# Patient Record
Sex: MALE | Race: BLACK OR AFRICAN AMERICAN | Employment: FULL TIME | URBAN - METROPOLITAN AREA
[De-identification: names, ages, dates, MRNs, and addresses within clinical notes are randomized per-mention and may not be internally consistent; named-entity substitution may affect disease eponyms.]

---

## 2021-06-30 PROCEDURE — 99283 EMERGENCY DEPT VISIT LOW MDM: CPT

## 2021-07-01 ENCOUNTER — HOSPITAL ENCOUNTER (EMERGENCY)
Age: 36
Discharge: HOME | End: 2021-07-01
Payer: COMMERCIAL

## 2021-07-01 VITALS
SYSTOLIC BLOOD PRESSURE: 147 MMHG | DIASTOLIC BLOOD PRESSURE: 95 MMHG | WEIGHT: 210 LBS | TEMPERATURE: 96.9 F | HEART RATE: 125 BPM | BODY MASS INDEX: 29.4 KG/M2 | RESPIRATION RATE: 18 BRPM | OXYGEN SATURATION: 93 % | HEIGHT: 71 IN

## 2021-07-01 DIAGNOSIS — T14.8XXA FOREIGN BODY IN SKIN: Primary | ICD-10-CM

## 2021-07-01 PROCEDURE — 6370000000 HC RX 637 (ALT 250 FOR IP): Performed by: NURSE PRACTITIONER

## 2021-07-01 RX ORDER — CEPHALEXIN 500 MG/1
500 CAPSULE ORAL ONCE
Status: COMPLETED | OUTPATIENT
Start: 2021-07-01 | End: 2021-07-01

## 2021-07-01 RX ORDER — CEPHALEXIN 500 MG/1
500 CAPSULE ORAL 4 TIMES DAILY
Qty: 40 CAPSULE | Refills: 0 | Status: SHIPPED | OUTPATIENT
Start: 2021-07-01 | End: 2021-07-08 | Stop reason: ALTCHOICE

## 2021-07-01 RX ADMIN — CEPHALEXIN 500 MG: 500 CAPSULE ORAL at 00:32

## 2021-07-01 NOTE — ED PROVIDER NOTES
Independent    HPI:  7/1/21, Time: 12:30 AM EDT         Gi Ace is a 28 y.o. male presenting to the ED for concerns regarding foreign body in his left upper arm. Patient reports that 2 to 3 days ago he was at his mom's house and states that his arm hit a door and put a splinter in his arm. Patient denies any excess pain there is no surrounding erythema no unusual discharge. Patient reports being up-to-date on his tetanus immunization. Patient was unclear if it will come out on its own or if we will need to remove it. Patient on exam with pinpoint area to left anterior upper arm above antecubital region. There is no erythema no gross swelling noted. And is barely palpable on physical examination. Patient otherwise denies weakness to that arm numbness or tingling. Denies any fevers. Symptoms mild in severity but persistent. No associated pain  Review of Systems:   A complete review of systems was performed and pertinent positives and negatives are stated within HPI, all other systems reviewed and are negative.          --------------------------------------------- PAST HISTORY ---------------------------------------------  Past Medical History:  has no past medical history on file. Past Surgical History:  has no past surgical history on file. Social History:  reports that he has been smoking. He has never used smokeless tobacco. He reports current alcohol use. He reports previous drug use. Family History: family history is not on file. The patients home medications have been reviewed. Allergies: Patient has no known allergies. -------------------------------------------------- RESULTS -------------------------------------------------  All laboratory and radiology results have been personally reviewed by myself   LABS:  No results found for this visit on 07/01/21.     RADIOLOGY:  Interpreted by Radiologist.  No orders to display       ------------------------- NURSING NOTES AND VITALS REVIEWED ---------------------------   The nursing notes within the ED encounter and vital signs as below have been reviewed. BP (!) 147/95   Pulse 125   Temp 96.9 °F (36.1 °C) (Temporal)   Resp 18   Ht 5' 11\" (1.803 m)   Wt 210 lb (95.3 kg)   SpO2 93%   BMI 29.29 kg/m²   Oxygen Saturation Interpretation: Normal      ---------------------------------------------------PHYSICAL EXAM--------------------------------------      Constitutional/General: Alert and oriented x3, well appearing, non toxic in NAD  Head: Normocephalic and atraumatic  Eyes: PERRL, EOMI  Mouth: Oropharynx clear, handling secretions, no trismus  Neck: Supple, full ROM,   Pulmonary: Lungs clear to auscultation bilaterally, no wheezes, rales, or rhonchi. Not in respiratory distress  Cardiovascular:  Regular rate and rhythm, no murmurs, gallops, or rubs. 2+ distal pulses  Abdomen: Soft, non tender, non distended,   Extremities: Moves all extremities x 4. Warm and well perfused  Skin: warm and dry without rash,   Patient on exam with pinpoint area to left anterior upper arm above antecubital region. There is no erythema no gross swelling noted. And is barely palpable on physical examination. Patient otherwise denies weakness to that arm numbness or tingling. Denies any fevers. Symptoms mild in severity but persistent. No associated pain  Neurologic: GCS 15,  Psych: Normal Affect      ------------------------------ ED COURSE/MEDICAL DECISION MAKING----------------------  Medications   cephALEXin (KEFLEX) capsule 500 mg (500 mg Oral Given 7/1/21 0032)         ED COURSE:       Medical Decision Making: Plan will be for evaluation. Patient declined wanting to get an x-ray. States that it was actually pinpoint in nature as well. We will start him on Keflex. He was educated to not pick or [place] area and that will come to surface the importance of following up with his primary care physician.   He will be discharged home with

## 2021-07-08 ENCOUNTER — HOSPITAL ENCOUNTER (EMERGENCY)
Age: 36
Discharge: HOME | End: 2021-07-09
Payer: COMMERCIAL

## 2021-07-08 DIAGNOSIS — R19.7 NAUSEA VOMITING AND DIARRHEA: Primary | ICD-10-CM

## 2021-07-08 DIAGNOSIS — R11.2 NAUSEA VOMITING AND DIARRHEA: Primary | ICD-10-CM

## 2021-07-08 LAB
ALBUMIN SERPL-MCNC: 4.3 G/DL (ref 3.5–5.2)
ALP BLD-CCNC: 105 U/L (ref 40–129)
ALT SERPL-CCNC: 19 U/L (ref 0–40)
ANION GAP SERPL CALCULATED.3IONS-SCNC: 11 MMOL/L (ref 7–16)
AST SERPL-CCNC: 28 U/L (ref 0–39)
BASOPHILS ABSOLUTE: 0.03 E9/L (ref 0–0.2)
BASOPHILS RELATIVE PERCENT: 0.5 % (ref 0–2)
BILIRUB SERPL-MCNC: 0.2 MG/DL (ref 0–1.2)
BUN BLDV-MCNC: 10 MG/DL (ref 6–20)
CALCIUM SERPL-MCNC: 8.8 MG/DL (ref 8.6–10.2)
CHLORIDE BLD-SCNC: 101 MMOL/L (ref 98–107)
CO2: 22 MMOL/L (ref 22–29)
CREAT SERPL-MCNC: 1 MG/DL (ref 0.7–1.2)
EOSINOPHILS ABSOLUTE: 0.15 E9/L (ref 0.05–0.5)
EOSINOPHILS RELATIVE PERCENT: 2.4 % (ref 0–6)
GFR AFRICAN AMERICAN: >60
GFR NON-AFRICAN AMERICAN: >60 ML/MIN/1.73
GLUCOSE BLD-MCNC: 122 MG/DL (ref 74–99)
HCT VFR BLD CALC: 48.7 % (ref 37–54)
HEMOGLOBIN: 16.1 G/DL (ref 12.5–16.5)
IMMATURE GRANULOCYTES #: 0.02 E9/L
IMMATURE GRANULOCYTES %: 0.3 % (ref 0–5)
LACTIC ACID: 1.2 MMOL/L (ref 0.5–2.2)
LIPASE: 100 U/L (ref 13–60)
LYMPHOCYTES ABSOLUTE: 1.68 E9/L (ref 1.5–4)
LYMPHOCYTES RELATIVE PERCENT: 26.8 % (ref 20–42)
MCH RBC QN AUTO: 26 PG (ref 26–35)
MCHC RBC AUTO-ENTMCNC: 33.1 % (ref 32–34.5)
MCV RBC AUTO: 78.5 FL (ref 80–99.9)
MONOCYTES ABSOLUTE: 0.87 E9/L (ref 0.1–0.95)
MONOCYTES RELATIVE PERCENT: 13.9 % (ref 2–12)
NEUTROPHILS ABSOLUTE: 3.51 E9/L (ref 1.8–7.3)
NEUTROPHILS RELATIVE PERCENT: 56.1 % (ref 43–80)
PDW BLD-RTO: 17.4 FL (ref 11.5–15)
PLATELET # BLD: 274 E9/L (ref 130–450)
PMV BLD AUTO: 9.7 FL (ref 7–12)
POTASSIUM SERPL-SCNC: 3.6 MMOL/L (ref 3.5–5)
RBC # BLD: 6.2 E12/L (ref 3.8–5.8)
SODIUM BLD-SCNC: 134 MMOL/L (ref 132–146)
TOTAL PROTEIN: 7.4 G/DL (ref 6.4–8.3)
WBC # BLD: 6.3 E9/L (ref 4.5–11.5)

## 2021-07-08 PROCEDURE — 83605 ASSAY OF LACTIC ACID: CPT

## 2021-07-08 PROCEDURE — 80053 COMPREHEN METABOLIC PANEL: CPT

## 2021-07-08 PROCEDURE — 99282 EMERGENCY DEPT VISIT SF MDM: CPT

## 2021-07-08 PROCEDURE — 85025 COMPLETE CBC W/AUTO DIFF WBC: CPT

## 2021-07-08 PROCEDURE — 83690 ASSAY OF LIPASE: CPT

## 2021-07-08 PROCEDURE — 2580000003 HC RX 258: Performed by: NURSE PRACTITIONER

## 2021-07-08 PROCEDURE — 81003 URINALYSIS AUTO W/O SCOPE: CPT

## 2021-07-08 PROCEDURE — 6360000002 HC RX W HCPCS: Performed by: NURSE PRACTITIONER

## 2021-07-08 PROCEDURE — 36415 COLL VENOUS BLD VENIPUNCTURE: CPT

## 2021-07-08 PROCEDURE — 96374 THER/PROPH/DIAG INJ IV PUSH: CPT

## 2021-07-08 PROCEDURE — 96361 HYDRATE IV INFUSION ADD-ON: CPT

## 2021-07-08 RX ORDER — 0.9 % SODIUM CHLORIDE 0.9 %
1000 INTRAVENOUS SOLUTION INTRAVENOUS ONCE
Status: COMPLETED | OUTPATIENT
Start: 2021-07-08 | End: 2021-07-09

## 2021-07-08 RX ORDER — ONDANSETRON 2 MG/ML
4 INJECTION INTRAMUSCULAR; INTRAVENOUS ONCE
Status: COMPLETED | OUTPATIENT
Start: 2021-07-08 | End: 2021-07-08

## 2021-07-08 RX ADMIN — SODIUM CHLORIDE 1000 ML: 9 INJECTION, SOLUTION INTRAVENOUS at 22:53

## 2021-07-08 RX ADMIN — ONDANSETRON 4 MG: 2 INJECTION INTRAMUSCULAR; INTRAVENOUS at 22:52

## 2021-07-09 VITALS
BODY MASS INDEX: 28 KG/M2 | TEMPERATURE: 97.1 F | HEART RATE: 82 BPM | SYSTOLIC BLOOD PRESSURE: 146 MMHG | RESPIRATION RATE: 16 BRPM | DIASTOLIC BLOOD PRESSURE: 90 MMHG | HEIGHT: 71 IN | WEIGHT: 200 LBS | OXYGEN SATURATION: 94 %

## 2021-07-09 LAB
BILIRUBIN URINE: NEGATIVE
BLOOD, URINE: NEGATIVE
CLARITY: CLEAR
COLOR: YELLOW
GLUCOSE URINE: NEGATIVE MG/DL
KETONES, URINE: NEGATIVE MG/DL
LEUKOCYTE ESTERASE, URINE: NEGATIVE
NITRITE, URINE: NEGATIVE
PH UA: 6 (ref 5–9)
PROTEIN UA: NEGATIVE MG/DL
SPECIFIC GRAVITY UA: <=1.005 (ref 1–1.03)
UROBILINOGEN, URINE: 0.2 E.U./DL

## 2021-07-09 RX ORDER — ONDANSETRON 4 MG/1
4 TABLET, ORALLY DISINTEGRATING ORAL EVERY 8 HOURS PRN
Qty: 10 TABLET | Refills: 0 | Status: SHIPPED | OUTPATIENT
Start: 2021-07-09 | End: 2022-07-09

## 2021-07-09 NOTE — ED PROVIDER NOTES
[address]  Department of Emergency Medicine   ED  Encounter Note  Admit Date/RoomTime: 2021 10:38 PM  ED Room: 35/35    NAME: Erick Paige  : 1985  MRN: [id number]     Chief Complaint:  Nausea (since this morning. )    History of Present Illness       Erick Paige is a 28 y.o. old male who presents to the emergency department by private vehicle, for intermittent episodes of nausea, dry heaves & diarrhea which began 1 day(s) prior to arrival.  There has been no similar episodes in the past. He states: no travel, recent antibiotics, tainted food, contact with contagious person, history of GI disease, new medications or change in diet. Since onset the symptoms have been intermittent. His stool quality has been described as watery. The symptoms are associated with no additional symptoms. Symptoms are aggravated by eating and liquids and relieved by nothing. There has been no headache, fever chills, rhinorrhea, otalgia, sore throat, chest pain, shortness of breath, cough, abdominal pain, dysuria, rash, and urethral discharge. ROS   Pertinent positives and negatives are stated within HPI, all other systems reviewed and are negative. Past Medical History:  has no past medical history on file. Surgical History:  has no past surgical history on file. Social History:  reports that he has been smoking. He has never used smokeless tobacco. He reports current alcohol use. He reports previous drug use. Family History: family history is not on file. Allergies: Patient has no known allergies. Physical Exam   Oxygen Saturation Interpretation: Normal on room air analysis.         ED Triage Vitals   BP Temp Temp Source Pulse Resp SpO2 Height Weight   21 2234 21 22321 22321 22321 22321   (!) 151/92 97.1 °F (36.2 °C) Temporal 111 20 94 % 5' 11\" (1.803 m) 200 lb (90.7 kg) Constitutional:  Alert, development consistent with age. HEENT:  NC/NT. Airway patent. Neck:  Normal ROM. Supple. Respiratory:  Clear to auscultation and breath sounds equal.  CV: Tachycardic and regular rhythm, normal heart sounds, without pathological murmurs, ectopy, gallops, or rubs. GI:  normal appearing, non-distended with no visible hernias. Bowel sounds: normal bowel sounds. Tenderness: No abdominal tenderness, guarding, rebound, rigidity or pulsatile mass. .        Liver: non-tender. Spleen:  non-tender and non-palpable. /Rectal: Rectal Examination deferred, N/A or declined by patient  Back: CVA Tenderness: No CVA tenderness. Integument:  Normal turgor. Warm, dry, without visible rash, unless noted elsewhere. Lymphatics: No lymphangitis or adenopathy noted. Neurological:  Oriented. Motor functions intact.     Lab / Imaging Results   (All laboratory and radiology results have been personally reviewed by myself)  Labs:  Results for orders placed or performed during the hospital encounter of 07/08/21   CBC Auto Differential   Result Value Ref Range    WBC 6.3 4.5 - 11.5 E9/L    RBC 6.20 (H) 3.80 - 5.80 E12/L    Hemoglobin 16.1 12.5 - 16.5 g/dL    Hematocrit 48.7 37.0 - 54.0 %    MCV 78.5 (L) 80.0 - 99.9 fL    MCH 26.0 26.0 - 35.0 pg    MCHC 33.1 32.0 - 34.5 %    RDW 17.4 (H) 11.5 - 15.0 fL    Platelets 430 742 - 735 E9/L    MPV 9.7 7.0 - 12.0 fL    Neutrophils % 56.1 43.0 - 80.0 %    Immature Granulocytes % 0.3 0.0 - 5.0 %    Lymphocytes % 26.8 20.0 - 42.0 %    Monocytes % 13.9 (H) 2.0 - 12.0 %    Eosinophils % 2.4 0.0 - 6.0 %    Basophils % 0.5 0.0 - 2.0 %    Neutrophils Absolute 3.51 1.80 - 7.30 E9/L    Immature Granulocytes # 0.02 E9/L    Lymphocytes Absolute 1.68 1.50 - 4.00 E9/L    Monocytes Absolute 0.87 0.10 - 0.95 E9/L    Eosinophils Absolute 0.15 0.05 - 0.50 E9/L    Basophils Absolute 0.03 0.00 - 0.20 E9/L   Comprehensive Metabolic Panel   Result Value Ref Range    Sodium 134 132 - 146 mmol/L    Potassium 3.6 3.5 - 5.0 mmol/L    Chloride 101 98 - 107 mmol/L    CO2 22 22 - 29 mmol/L    Anion Gap 11 7 - 16 mmol/L    Glucose 122 (H) 74 - 99 mg/dL    BUN 10 6 - 20 mg/dL    CREATININE 1.0 0.7 - 1.2 mg/dL    GFR Non-African American >60 >=60 mL/min/1.73    GFR African American >60     Calcium 8.8 8.6 - 10.2 mg/dL    Total Protein 7.4 6.4 - 8.3 g/dL    Albumin 4.3 3.5 - 5.2 g/dL    Total Bilirubin 0.2 0.0 - 1.2 mg/dL    Alkaline Phosphatase 105 40 - 129 U/L    ALT 19 0 - 40 U/L    AST 28 0 - 39 U/L   Lipase   Result Value Ref Range    Lipase 100 (H) 13 - 60 U/L   Urinalysis   Result Value Ref Range    Color, UA Yellow Straw/Yellow    Clarity, UA Clear Clear    Glucose, Ur Negative Negative mg/dL    Bilirubin Urine Negative Negative    Ketones, Urine Negative Negative mg/dL    Specific Gravity, UA <=1.005 1.005 - 1.030    Blood, Urine Negative Negative    pH, UA 6.0 5.0 - 9.0    Protein, UA Negative Negative mg/dL    Urobilinogen, Urine 0.2 <2.0 E.U./dL    Nitrite, Urine Negative Negative    Leukocyte Esterase, Urine Negative Negative   Lactic Acid, Plasma   Result Value Ref Range    Lactic Acid 1.2 0.5 - 2.2 mmol/L     Imaging: All Radiology results interpreted by Radiologist unless otherwise noted. No orders to display     ED Course / Medical Decision Making     Medications   0.9 % sodium chloride bolus (1,000 mLs Intravenous New Bag 7/8/21 2253)   ondansetron (ZOFRAN) injection 4 mg (4 mg Intravenous Given 7/8/21 2252)        Re-examination:  7/8/21       Time: 0015  Patients condition is improving after treatment. Consultations:             None    Procedure(s):   none    MDM:   Patient presented with nausea, dry heaving, and watery stools. He appeared well, nontoxic, and comfortable. He was negative for acute abdomen. His diagnostics were reviewed and discussed with him. He was hydrated with IV fluids and given IV Zofran.   He had no episodes of vomiting or diarrhea in the emergency department. Symptoms improved with treatment. He is appropriate for discharge and outpatient follow-up. He is instructed to return to the emergency department with any new or worsening symptoms. Plan of Care/Counseling:  BECKY Estevez CNP reviewed today's visit with the patient in addition to providing specific details for the plan of care and counseling regarding the diagnosis and prognosis. Questions are answered at this time and are agreeable with the plan. Assessment     1. Nausea vomiting and diarrhea      Plan   Discharged home. Patient condition is good    New Medications     New Prescriptions    ONDANSETRON (ZOFRAN ODT) 4 MG DISINTEGRATING TABLET    Take 1 tablet by mouth every 8 hours as needed for Nausea or Vomiting     Electronically signed by BECKY Estevez CNP   DD: 7/8/21  **This report was transcribed using voice recognition software. Every effort was made to ensure accuracy; however, inadvertent computerized transcription errors may be present.   END OF ED PROVIDER NOTE     BECKY Guevara CNP  07/09/21 0034

## 2021-09-30 ENCOUNTER — HOSPITAL ENCOUNTER (EMERGENCY)
Age: 36
Discharge: HOME | End: 2021-09-30
Payer: COMMERCIAL

## 2021-09-30 VITALS
OXYGEN SATURATION: 100 % | TEMPERATURE: 97.5 F | DIASTOLIC BLOOD PRESSURE: 125 MMHG | WEIGHT: 200 LBS | BODY MASS INDEX: 28 KG/M2 | RESPIRATION RATE: 16 BRPM | HEART RATE: 95 BPM | SYSTOLIC BLOOD PRESSURE: 148 MMHG | HEIGHT: 71 IN

## 2021-09-30 DIAGNOSIS — J01.00 ACUTE MAXILLARY SINUSITIS, RECURRENCE NOT SPECIFIED: Primary | ICD-10-CM

## 2021-09-30 PROCEDURE — 99283 EMERGENCY DEPT VISIT LOW MDM: CPT

## 2021-09-30 PROCEDURE — U0003 INFECTIOUS AGENT DETECTION BY NUCLEIC ACID (DNA OR RNA); SEVERE ACUTE RESPIRATORY SYNDROME CORONAVIRUS 2 (SARS-COV-2) (CORONAVIRUS DISEASE [COVID-19]), AMPLIFIED PROBE TECHNIQUE, MAKING USE OF HIGH THROUGHPUT TECHNOLOGIES AS DESCRIBED BY CMS-2020-01-R: HCPCS

## 2021-09-30 PROCEDURE — U0005 INFEC AGEN DETEC AMPLI PROBE: HCPCS

## 2021-09-30 RX ORDER — FLUTICASONE PROPIONATE 50 MCG
2 SPRAY, SUSPENSION (ML) NASAL DAILY
Qty: 16 G | Refills: 0 | Status: SHIPPED | OUTPATIENT
Start: 2021-09-30

## 2021-09-30 RX ORDER — AMOXICILLIN AND CLAVULANATE POTASSIUM 875; 125 MG/1; MG/1
1 TABLET, FILM COATED ORAL 2 TIMES DAILY
Qty: 20 TABLET | Refills: 0 | Status: SHIPPED | OUTPATIENT
Start: 2021-09-30 | End: 2021-10-10

## 2021-10-01 LAB — SARS-COV-2, PCR: NOT DETECTED

## 2021-10-01 NOTE — ED PROVIDER NOTES
[facility]  Department of Emergency Medicine   ED  Encounter Note  Admit Date/RoomTime: 2021  1:03 PM  ED Room: [address]    NAME: Stephanie James  : 1985  MRN: [id number]     Chief Complaint:  Nasal Congestion (per pt feels drainage going down throat w/sinus headache. Per pt has occassional cough.)    History of Present Illness       Stephanie James is a 39 y.o. old male who presents to the emergency department by private vehicle, for sinus congestion/pressure, productive cough x2 weeks. Patient states symptoms are mild in severity. Patient denies any make it better or worse. Patient has not tried OTC medications. Denies fever/chills, headache, vision change, dizziness, loss of taste or smell, sore throat, chest pain, dyspnea, abdominal pain, NVD, numbness/weakness. ROS   Pertinent positives and negatives are stated within HPI, all other systems reviewed and are negative. Past Medical History:  has no past medical history on file. Surgical History:  has no past surgical history on file. Social History:  reports that he has been smoking. He has never used smokeless tobacco. He reports current alcohol use. He reports previous drug use. Family History: family history is not on file. Allergies: Patient has no known allergies. Physical Exam   Oxygen Saturation Interpretation: Normal on room air analysis. ED Triage Vitals   BP Temp Temp Source Pulse Resp SpO2 Height Weight   21 1301 21 1236 21 1236 21 1236 21 1301 21 1236 21 1301 21 1301   (!) 148/125 97.5 °F (36.4 °C) Oral 104 16 97 % 5' 11\" (1.803 m) 200 lb (90.7 kg)         · Constitutional:  Alert, development consistent with age. · Ears:  External Ears: Bilateral normal.               TM's & External Canals: normal appearance. · Nose:   There is purulent rhinorrhea. · Sinuses: mild Bilateral maxillary sinus tenderness. no Bilateral frontal sinus tenderness. · Mouth:  normal tongue and buccal mucosa. · Throat: no erythema or exudates noted. Teeth and gums normal..  Airway Patent. · Neck:  Supple. There is no node tenderness. · Respiratory:   Breath sounds: Bilateral normal.  Lung sounds: normal.   · CV:  Regular rate and rhythm, normal heart sounds, without pathological murmurs, ectopy, gallops, or rubs. · GI:  Abdomen Soft, nontender, good bowel sounds. No firm or pulsatile mass. · Integument:  Normal turgor. Warm, dry, without visible rash. · Neurological:  Oriented. Motor functions intact. Lab / Imaging Results   (All laboratory and radiology results have been personally reviewed by myself)  Labs:  No results found for this visit on 09/30/21. Imaging: All Radiology results interpreted by Radiologist unless otherwise noted. No orders to display       ED Course / Medical Decision Making   Medications - No data to display         Consults:   None    Procedures:   none    Medical Decision Making: Patient presenting with sinusitis and cough. Patient is in no acute distress, afebrile, nontoxic appearance. Patient be tested for Covid but will be started on Augmentin due to the length of his symptoms and sinus pressure and tenderness. Patient will also be started on Flonase. Recommend patient follow-up with PCP. Recommend patient self quarantine until receiving Covid results. Recommend patient return to the ED with new or worsening of symptoms. Assessment     1. Acute maxillary sinusitis, recurrence not specified      Plan   Discharged home.   Patient condition is stable    New Medications     Discharge Medication List as of 9/30/2021  1:28 PM      START taking these medications    Details   amoxicillin-clavulanate (AUGMENTIN) 875-125 MG per tablet Take 1 tablet by mouth 2 times daily for 10 days, Disp-20 tablet, R-0Normal      fluticasone (FLONASE) 50 MCG/ACT nasal spray 2 sprays by Each Nostril route daily, Disp-16 g, R-0Normal           Electronically signed by Jaki Medrano PA-C   DD: 10/1/21  **This report was transcribed using voice recognition software. Every effort was made to ensure accuracy; however, inadvertent computerized transcription errors may be present.   END OF ED PROVIDER NOTE       Jaki Medrano PA-C  10/01/21 8543

## 2021-10-17 ENCOUNTER — APPOINTMENT (OUTPATIENT)
Dept: GENERAL RADIOLOGY | Age: 36
End: 2021-10-17
Payer: COMMERCIAL

## 2021-10-17 ENCOUNTER — HOSPITAL ENCOUNTER (EMERGENCY)
Age: 36
Discharge: HOME | End: 2021-10-17
Payer: COMMERCIAL

## 2021-10-17 VITALS
HEART RATE: 84 BPM | TEMPERATURE: 96.7 F | RESPIRATION RATE: 14 BRPM | BODY MASS INDEX: 28 KG/M2 | OXYGEN SATURATION: 98 % | WEIGHT: 200 LBS | SYSTOLIC BLOOD PRESSURE: 136 MMHG | DIASTOLIC BLOOD PRESSURE: 89 MMHG | HEIGHT: 71 IN

## 2021-10-17 DIAGNOSIS — S61.312A LACERATION OF RIGHT MIDDLE FINGER WITHOUT FOREIGN BODY WITH DAMAGE TO NAIL, INITIAL ENCOUNTER: Primary | ICD-10-CM

## 2021-10-17 PROCEDURE — 2500000003 HC RX 250 WO HCPCS: Performed by: NURSE PRACTITIONER

## 2021-10-17 PROCEDURE — 12001 RPR S/N/AX/GEN/TRNK 2.5CM/<: CPT

## 2021-10-17 PROCEDURE — 73140 X-RAY EXAM OF FINGER(S): CPT

## 2021-10-17 PROCEDURE — 99283 EMERGENCY DEPT VISIT LOW MDM: CPT

## 2021-10-17 PROCEDURE — 6370000000 HC RX 637 (ALT 250 FOR IP): Performed by: NURSE PRACTITIONER

## 2021-10-17 RX ORDER — HYDROCODONE BITARTRATE AND ACETAMINOPHEN 5; 325 MG/1; MG/1
1 TABLET ORAL EVERY 8 HOURS PRN
Qty: 6 TABLET | Refills: 0 | Status: SHIPPED | OUTPATIENT
Start: 2021-10-17 | End: 2021-10-19

## 2021-10-17 RX ORDER — IBUPROFEN 800 MG/1
800 TABLET ORAL EVERY 6 HOURS PRN
Qty: 20 TABLET | Refills: 0 | Status: SHIPPED | OUTPATIENT
Start: 2021-10-17 | End: 2022-06-27

## 2021-10-17 RX ORDER — DIAPER,BRIEF,INFANT-TODD,DISP
EACH MISCELLANEOUS ONCE
Status: COMPLETED | OUTPATIENT
Start: 2021-10-17 | End: 2021-10-17

## 2021-10-17 RX ORDER — LIDOCAINE HYDROCHLORIDE 10 MG/ML
20 INJECTION, SOLUTION INFILTRATION; PERINEURAL ONCE
Status: COMPLETED | OUTPATIENT
Start: 2021-10-17 | End: 2021-10-17

## 2021-10-17 RX ADMIN — BACITRACIN: 500 OINTMENT TOPICAL at 18:14

## 2021-10-17 RX ADMIN — LIDOCAINE HYDROCHLORIDE 20 ML: 10 INJECTION, SOLUTION INFILTRATION; PERINEURAL at 18:14

## 2021-10-17 ASSESSMENT — PAIN DESCRIPTION - ORIENTATION: ORIENTATION: RIGHT

## 2021-10-17 ASSESSMENT — PAIN DESCRIPTION - PAIN TYPE: TYPE: ACUTE PAIN

## 2021-10-17 ASSESSMENT — PAIN DESCRIPTION - LOCATION: LOCATION: HAND

## 2021-10-17 ASSESSMENT — PAIN SCALES - GENERAL
PAINLEVEL_OUTOF10: 7
PAINLEVEL_OUTOF10: 7

## 2021-10-17 NOTE — ED PROVIDER NOTES
ED Attending shared visit  CC: [facility]  Department of Emergency Medicine   ED  Encounter Note  Admit Date/RoomTime: 10/17/2021  5:08 PM  ED Room: 35/35    NAME: Nahid Diaz  : 1985  MRN: [id number]     Chief Complaint:  Laceration (pt sustained laceration to right hand at work on third digit. WC - [employer])    History of Present Illness       Nahid Diaz is a 39 y.o. old male presenting to the emergency department by private vehicle, for a laceration to the right 3rd distal finger tip and nail bed, caused by metal blade and a bread slicing machine, which occurred at work approximately 3:30 PM prior to arrival.  There is not a possibility of retained foreign body in the affected area. Bleeding is  controlled. He takes no blood thinning agents. There is mild pain at injury site. Patient is right-hand dominant. He denies any paresthesias of the fingers or wrist pain. Tetanus Status:  within past 5 years and was updated 2 years ago while incarcerated. ROS   Pertinent positives and negatives are stated within HPI, all other systems reviewed and are negative. Past Medical History:  has no past medical history on file. Surgical History:  has no past surgical history on file. Social History:  reports that he has been smoking. He has never used smokeless tobacco. He reports current alcohol use. He reports previous drug use. Family History: family history is not on file. Allergies: Patient has no known allergies. Physical Exam   Oxygen Saturation Interpretation: Normal.        ED Triage Vitals   BP Temp Temp Source Pulse Resp SpO2 Height Weight   10/17/21 1655 10/17/21 1653 10/17/21 1653 10/17/21 1653 10/17/21 1653 10/17/21 1653 10/17/21 1653 10/17/21 1653   (!) 167/103 96.7 °F (35.9 °C) Temporal 118 14 98 % 5' 11\" (1.803 m) 200 lb (90.7 kg)         Constitutional:  Alert, development consistent with age. Neck:  Normal ROM. Supple. Non-tender. Hand: Right             Tenderness: none. Swelling: None. Deformity: no.             Skin: no wounds, erythema, or swelling. Neurovascular: Motor deficit: none. Sensory deficit: none. Full sensation intact to light touch in distal finger. Pulse deficit: none. 2+ radial pulse intact. Capillary refill: normal.  Less than 3 seconds in distal fingers. Fingers:  Right Middle finger distal phalanx dorsal aspect and nail bed            Tenderness:  mild to the nailbed and distal tip of finger at laceration site of the third finger on the right. Swelling: None. Deformity: no.              ROM: full range of motion of the MCP PIP and DIP joint with no pain. Skin:  See photo. Wrist:  Right             Tenderness:  none. Swelling: None. Deformity: no.             ROM: full range of motion. Skin:  no wounds, erythema, or swelling. Lymphatics: No lymphangitis or adenopathy noted. Neurological:  Oriented. Motor functions intact. **Informed Consent**    The patient has given verbal consent to have photos taken of right hand and electronically inserted into their ED Provider Note as part of their permanent medical record for purposes of illustration, documentation, treatment management and/or medical review. All Images taken on 10/17/21 of patient name: Nahid Diaz were taken by a [facility] approved registered mobile device via [employer] mobile application and transmitted then stored on a secured [employer] Site located within [facility]. Right middle finger full flexion extension at the MCP PIP and DIP joints. Good thumb opposition.         Lab / Imaging Results   (All laboratory and radiology results have been personally reviewed by myself)  Labs:  No results found for this visit on 10/17/21. Imaging: All Radiology results interpreted by Radiologist unless otherwise noted. XR FINGER RIGHT (MIN 2 VIEWS)   Final Result   Soft tissue irregularity to the nailbed of the right long finger. No acute   osseous findings. Normal alignment. ED Course / Medical Decision Making     Medications   lidocaine 1 % injection 20 mL (20 mLs IntraDERmal Given 10/17/21 1814)   bacitracin zinc ointment ( Topical Given 10/17/21 1814)     ED Course as of Oct 17 1909   Sun Oct 17, 2021   1903 Dr. Chance Files into examine patient.     [SE]      ED Course User Index  [SE] BECKY Velazquez CNP     Consult(s):   None    Procedure(s):   PROCEDURE NOTE  10/17/21       Time: 8780    LACERATION REPAIR  Risks, benefits and alternatives (for applicable procedures below) described. Performed By: BECKY Velazquez CNP. Informed consent: Verbal consent obtained. The patient was counseled regarding the procedure in person, it's indications, risks, potential complications and alternatives and any questions were answered. Verbal consent was obtained. Laceration #: 1. Location: right 3rd distal finger  Length: 1cm to the distal tip of the finger and .5 cm laceration to the nailbed and plate. The wound area was irrigated with sterile saline, cleansend with shur-clens and draped in a sterile fashion. Local Anesthesia:  obtained with Lidocaine 1% without epinephrine. The wound was explored with the following results: Thickness: partial thickness. no foreign body or tendon injury seen. Debridement: None. Undermining: None. Wound Margins Revised: yes. Flaps Aligned: no. The wound was closed in single layer closure with #3  5-0 Ethilon using interrupted suture(s). 4-0 Vicryl to the nailbed. Dressing:  bacitracin, a sterile dressing and a bandage. There were no additional wounds requiring formal closure. Patient tolerated procedure well.        MDM:   Imaging was obtained based on low suspicion for fracture / bony abnormalityas per history/physical findings. His blood pressure was elevated on admission and on discharge it was returned in 136/89 and is asymptomatic most likely from trauma. She had wound thoroughly irrigated he was instructed on signs and symptoms warranting immediate return to the ED for reevaluation at any time chest fever, chills, purulent drainage, streaking up the arm or pain out of proportion. Patient has no neurologic or sensory deficit on examination. X-ray was negative for any fractures. Advised patient on suture removal in the next 7 to 10 days and follow-up with corporate care for reevaluation in the next few days and will give him off work for the next 4 days and must keep it clean and covered at all times when returning to work. Advised not to drink alcohol, drive or operate heavy equipment while taking narcotic pain medication and to elevate the hand above the level of the heart is much as possible. Controlled Substance Monitoring:    Acute and Chronic Pain Monitoring:   RX Monitoring 10/17/2021   Periodic Controlled Substance Monitoring Possible medication side effects, risk of tolerance/dependence & alternative treatments discussed. ;No signs of potential drug abuse or diversion identified. Plan of Care/Counseling:  BECKY Fajardo CNP and EM Attending Physician reviewed today's visit with the patient in addition to providing specific details for the plan of care and counseling regarding the diagnosis and prognosis. Questions are answered at this time and are agreeable with the plan. Assessment      1. Laceration of right middle finger without foreign body with damage to nail, initial encounter      Plan   Discharged home. Patient condition is good    New Medications     New Prescriptions    No medications on file     Electronically signed by BECKY Fajardo CNP   DD: 10/17/21  **This report was transcribed using voice recognition software. Every effort was made to ensure accuracy; however, inadvertent computerized transcription errors may be present.   END OF ED PROVIDER NOTE      BECKY Mccallum - AGUS  10/17/21 2121

## 2021-10-17 NOTE — ED NOTES
FIRST PROVIDER CONTACT ASSESSMENT NOTE      Department of Emergency Medicine   10/17/21  4:56 PM EDT    Chief Complaint: Laceration (pt sustained laceration to right hand at work on third digit. WC - [employer])      History of Present Illness:   Jose Nowak is a 39 y.o. male who presents to the ED for laceration to the middle finger on the right hand he states he was pulling bread out of a slicer when the blade sliced his finger. Bleeding controlled. Last tetanus shot was 2 years ago per patient. Medical History:  has no past medical history on file. Surgical History:  has no past surgical history on file. Social History:  reports that he has been smoking. He has never used smokeless tobacco. He reports current alcohol use. He reports previous drug use. Family History: family history is not on file. *ALLERGIES*     Patient has no known allergies.      Physical Exam:      VS:  BP (!) 167/103   Pulse 118   Temp 96.7 °F (35.9 °C) (Temporal)   Resp 14   Ht 5' 11\" (1.803 m)   Wt 200 lb (90.7 kg)   SpO2 98%   BMI 27.89 kg/m²      Initial Plan of Care:  Initiate Treatment-Testing, Proceed toTreatment Area When Bed Available for ED Attending/MLP to Continue Care    -----------------END OF FIRST PROVIDER CONTACT ASSESSMENT NOTE--------------  Electronically signed by BECKY Stewart CNP   DD: 10/17/21             BECKY Stewart CNP  10/17/21 [id number]

## 2021-10-27 ENCOUNTER — HOSPITAL ENCOUNTER (EMERGENCY)
Age: 36
Discharge: HOME | End: 2021-10-27
Payer: COMMERCIAL

## 2021-10-27 VITALS
DIASTOLIC BLOOD PRESSURE: 95 MMHG | TEMPERATURE: 97.1 F | SYSTOLIC BLOOD PRESSURE: 141 MMHG | RESPIRATION RATE: 16 BRPM | HEART RATE: 99 BPM | OXYGEN SATURATION: 98 %

## 2021-10-27 DIAGNOSIS — Z48.02 VISIT FOR SUTURE REMOVAL: Primary | ICD-10-CM

## 2021-10-27 PROCEDURE — 6370000000 HC RX 637 (ALT 250 FOR IP): Performed by: NURSE PRACTITIONER

## 2021-10-27 PROCEDURE — 99282 EMERGENCY DEPT VISIT SF MDM: CPT

## 2021-10-27 RX ORDER — GINSENG 100 MG
CAPSULE ORAL ONCE
Status: COMPLETED | OUTPATIENT
Start: 2021-10-27 | End: 2021-10-27

## 2021-10-27 RX ADMIN — BACITRACIN: 500 OINTMENT TOPICAL at 14:33

## 2021-10-27 NOTE — ED NOTES
Discharge instructions given. Patient verbalizes understanding. No other noted or stated problems at this time. Patient will follow up with primary care.      Maria Guadalupe Vivar RN  10/27/21 [id number]

## 2021-10-27 NOTE — Clinical Note
Nano Rodriguez was seen and treated in our emergency department on 10/27/2021. He may return to work on 10/27/2021. ` Patient was seen in the emergency department is able to return to work. Instructed to keep finger clean and dry     If you have any questions or concerns, please don't hesitate to call.       Haydee Collins, BECKY - CNP

## 2021-10-27 NOTE — ED PROVIDER NOTES
[facility]  Department of Emergency Medicine   ED  Encounter Note  Admit Date/RoomTime: 10/27/2021  2:10 PM  ED Room: /    NAME: Yonas Mancini  : 1985  MRN: [id number]     Chief Complaint:  Suture / Staple Removal (right hand 2nd finger )    History of Present Illness       Yonas Mancini is a 39 y.o. old male who presents to the emergency department by private vehicle for removal of  sutures  from right 2nd distal finger, which were placed 10 day(s) prior to arrival at this department. Current antibiotic use: None. Tetanus Status:  up to date two years ago per patient. Associated Symptoms:      [x]   None        []   Pain      []   Swelling      []   Redness      []   Drainage      []   Bleeding      []   Fever/Chills     ROS   Pertinent positives and negatives are stated within HPI, all other systems reviewed and are negative. Past Medical History:  has no past medical history on file. Surgical History:  has no past surgical history on file. Social History:  reports that he has been smoking. He has never used smokeless tobacco. He reports current alcohol use. He reports previous drug use. Family History: family history is not on file. Allergies: Patient has no known allergies. Physical Exam   Oxygen Saturation Interpretation: Normal.        ED Triage Vitals [10/27/21 1332]   BP Temp Temp src Pulse Resp SpO2 Height Weight   (!) 141/95 97.1 °F (36.2 °C) -- 99 16 98 % -- --         Constitutional:  Alert, development consistent with age. HEENT:  NC/NT. Airway patent. Neck:  Normal ROM. Supple. Respiratory:  Clear to auscultation and breath sounds equal.    CV: Regular rate and rhythm, normal heart sounds, without pathological murmurs, ectopy, gallops, or rubs. GI:  Abdomen Soft, nontender, good bowel sounds. No firm or pulsatile mass. Integument:  No rashes, erythema present.   Lymphatics: No lymphangitis or adenopathy noted.  Extremities:  no tenderness, swelling or wounds. Neurological:  Oriented. Motor functions intact. Lab / Imaging Results   (All laboratory and radiology results have been personally reviewed by myself)  Labs:  No results found for this visit on 10/27/21. Imaging: All Radiology results interpreted by Radiologist unless otherwise noted. No orders to display       ED Course / Medical Decision Making     Medications   bacitracin ointment ( Topical Given 10/27/21 1433)        Consult(s):   None    Procedure(s):  PROCEDURE NOTE  10/27/21       Time: 4146    SUTURE/STAPLE REMOVAL  Risks, benefits and alternatives (for applicable procedures below) described. Performed By: BECKY Constantino CNP. Indication:  Wound healed. Informed consent: Verbal consent obtained. The patient was counseled regarding the procedure in person, it's indications, risks, potential complications and alternatives and any questions were answered. Verbal consent was obtained. Location: right 2nd distal finger  Procedure:  3 sutures were removed   without any difficulties . Patient has a Vicryl through the nailbed and nail which was left. No abnormal drainage warmth or erythema noted. Patient this will follow it on his own. .  Complications:  None. Wound care instructions provided. Patient/guardian was instructed to be alert for any signs of cutaneous infection. The patient tolerated the procedure well. MDM:   Patient presents to the ED for suture removal that was placed 10 days ago. Differential diagnoses included but not limited to suture removal with and without infection. Workup in the ED revealed upon examinations sutures were partially coming out. There is no drainage, erythema or warmth noted. 3 sutures were removed. The Vicryl was still intact with no signs or symptoms of infection. Tetanus shot was up-to-date per patient. Patient continues to be non-toxic on re-evaluation. Findings were discussed with the patient and reasons to immediately return to the ED were articulated to them. They will follow-up with their PMD.      Plan of Care/Counseling:  BECKY Dunbar CNP reviewed today's visit with the patient in addition to providing specific details for the plan of care and counseling regarding the diagnosis and prognosis. Questions are answered at this time and are agreeable with the plan. Assessment     1. Visit for suture removal      Plan   Discharged home. Patient condition is stable    New Medications     Discharge Medication List as of 10/27/2021  3:16 PM        Electronically signed by BECKY Dunbar CNP   DD: 10/27/21  **This report was transcribed using voice recognition software. Every effort was made to ensure accuracy; however, inadvertent computerized transcription errors may be present.   END OF ED PROVIDER NOTE        BECKY Dunbar CNP  10/27/21 [id number]

## 2022-06-16 ENCOUNTER — HOSPITAL ENCOUNTER (EMERGENCY)
Age: 37
Discharge: HOME | End: 2022-06-16
Attending: EMERGENCY MEDICINE
Payer: COMMERCIAL

## 2022-06-16 ENCOUNTER — APPOINTMENT (OUTPATIENT)
Dept: GENERAL RADIOLOGY | Age: 37
End: 2022-06-16
Payer: COMMERCIAL

## 2022-06-16 VITALS
TEMPERATURE: 97.9 F | HEART RATE: 90 BPM | SYSTOLIC BLOOD PRESSURE: 150 MMHG | DIASTOLIC BLOOD PRESSURE: 111 MMHG | OXYGEN SATURATION: 99 % | RESPIRATION RATE: 16 BRPM

## 2022-06-16 DIAGNOSIS — R00.2 PALPITATIONS: Primary | ICD-10-CM

## 2022-06-16 DIAGNOSIS — F41.1 ANXIETY STATE: ICD-10-CM

## 2022-06-16 LAB
ANION GAP SERPL CALCULATED.3IONS-SCNC: 18 MMOL/L (ref 7–16)
BASOPHILS ABSOLUTE: 0.04 E9/L (ref 0–0.2)
BASOPHILS RELATIVE PERCENT: 0.6 % (ref 0–2)
BUN BLDV-MCNC: 10 MG/DL (ref 6–20)
CALCIUM SERPL-MCNC: 8.4 MG/DL (ref 8.6–10.2)
CHLORIDE BLD-SCNC: 100 MMOL/L (ref 98–107)
CO2: 20 MMOL/L (ref 22–29)
CREAT SERPL-MCNC: 1 MG/DL (ref 0.7–1.2)
EKG ATRIAL RATE: 95 BPM
EKG P AXIS: 57 DEGREES
EKG P-R INTERVAL: 162 MS
EKG Q-T INTERVAL: 340 MS
EKG QRS DURATION: 86 MS
EKG QTC CALCULATION (BAZETT): 427 MS
EKG R AXIS: 92 DEGREES
EKG T AXIS: 30 DEGREES
EKG VENTRICULAR RATE: 95 BPM
EOSINOPHILS ABSOLUTE: 0.04 E9/L (ref 0.05–0.5)
EOSINOPHILS RELATIVE PERCENT: 0.6 % (ref 0–6)
GFR AFRICAN AMERICAN: >60
GFR NON-AFRICAN AMERICAN: >60 ML/MIN/1.73
GLUCOSE BLD-MCNC: 96 MG/DL (ref 74–99)
HCT VFR BLD CALC: 43.3 % (ref 37–54)
HEMOGLOBIN: 14.1 G/DL (ref 12.5–16.5)
IMMATURE GRANULOCYTES #: 0.01 E9/L
IMMATURE GRANULOCYTES %: 0.2 % (ref 0–5)
LYMPHOCYTES ABSOLUTE: 2.37 E9/L (ref 1.5–4)
LYMPHOCYTES RELATIVE PERCENT: 38.3 % (ref 20–42)
MCH RBC QN AUTO: 25 PG (ref 26–35)
MCHC RBC AUTO-ENTMCNC: 32.6 % (ref 32–34.5)
MCV RBC AUTO: 76.9 FL (ref 80–99.9)
MONOCYTES ABSOLUTE: 0.78 E9/L (ref 0.1–0.95)
MONOCYTES RELATIVE PERCENT: 12.6 % (ref 2–12)
NEUTROPHILS ABSOLUTE: 2.95 E9/L (ref 1.8–7.3)
NEUTROPHILS RELATIVE PERCENT: 47.7 % (ref 43–80)
PDW BLD-RTO: 15.1 FL (ref 11.5–15)
PLATELET # BLD: 221 E9/L (ref 130–450)
PMV BLD AUTO: 9.5 FL (ref 7–12)
POTASSIUM REFLEX MAGNESIUM: 3.8 MMOL/L (ref 3.5–5)
RBC # BLD: 5.63 E12/L (ref 3.8–5.8)
SODIUM BLD-SCNC: 138 MMOL/L (ref 132–146)
TROPONIN, HIGH SENSITIVITY: 7 NG/L (ref 0–11)
WBC # BLD: 6.2 E9/L (ref 4.5–11.5)

## 2022-06-16 PROCEDURE — 84484 ASSAY OF TROPONIN QUANT: CPT

## 2022-06-16 PROCEDURE — 93005 ELECTROCARDIOGRAM TRACING: CPT | Performed by: PHYSICIAN ASSISTANT

## 2022-06-16 PROCEDURE — 85025 COMPLETE CBC W/AUTO DIFF WBC: CPT

## 2022-06-16 PROCEDURE — 80048 BASIC METABOLIC PNL TOTAL CA: CPT

## 2022-06-16 PROCEDURE — 71045 X-RAY EXAM CHEST 1 VIEW: CPT

## 2022-06-16 PROCEDURE — 99285 EMERGENCY DEPT VISIT HI MDM: CPT

## 2022-06-16 RX ORDER — HYDROXYZINE PAMOATE 50 MG/1
50 CAPSULE ORAL 3 TIMES DAILY PRN
Qty: 21 CAPSULE | Refills: 0 | Status: SHIPPED | OUTPATIENT
Start: 2022-06-16 | End: 2022-06-27

## 2022-06-16 ASSESSMENT — PAIN SCALES - GENERAL: PAINLEVEL_OUTOF10: 4

## 2022-06-16 ASSESSMENT — ENCOUNTER SYMPTOMS
VOMITING: 0
ABDOMINAL PAIN: 0
EYE REDNESS: 0
NAUSEA: 0
SHORTNESS OF BREATH: 0

## 2022-06-16 ASSESSMENT — PAIN DESCRIPTION - FREQUENCY: FREQUENCY: INTERMITTENT

## 2022-06-16 ASSESSMENT — PAIN DESCRIPTION - LOCATION: LOCATION: CHEST

## 2022-06-16 ASSESSMENT — PAIN DESCRIPTION - DESCRIPTORS: DESCRIPTORS: POUNDING

## 2022-06-16 ASSESSMENT — PAIN - FUNCTIONAL ASSESSMENT: PAIN_FUNCTIONAL_ASSESSMENT: 0-10

## 2022-06-16 NOTE — ED PROVIDER NOTES
Chief complaint: Palpitations and anxiety      HPI:  6/16/22, Time: 9:08 AM EDT    HPI               Alistair Segura is a 39 y.o. male presenting to the ED for palpitations and anxiety. The history is obtained from the patient as well as patient's medical record. The patient is presenting emergency department the chief complaint of palpitations and anxiety. The patient states that he recently got out of [facility] and has been undergoing significant changes in his life. He states that he has been experiencing palpitations over the last few days. He states he also did have these been present. He states he would sit and overthink things and become \"worked up. \"He denies any pain associated with these. Nothing similar. Worsen when he is thinking. He states he is not currently take anything for anxiety. He denies any fevers, chills, chest pain, shortness of breath, nausea, vomiting, dysuria, diarrhea or constipation. He also states that since he has been out of [facility] he has not been eating or drinking appropriately and has been consuming alcohol. ROS:   Review of Systems   Constitutional: Negative for chills and fever. HENT: Negative for congestion. Eyes: Negative for redness. Respiratory: Negative for shortness of breath. Cardiovascular: Positive for palpitations. Negative for chest pain. Gastrointestinal: Negative for abdominal pain, nausea and vomiting. Genitourinary: Negative for dysuria. Musculoskeletal: Negative for arthralgias. Skin: Negative for rash. Neurological: Negative for light-headedness. Psychiatric/Behavioral: Negative for confusion. All other systems reviewed and are negative.      --------------------------------------------- PAST HISTORY ---------------------------------------------  Past Medical History:  has no past medical history on file. Past Surgical History:  has no past surgical history on file. Social History:  reports that he has been smoking.  He has never used smokeless tobacco. He reports current alcohol use. He reports previous drug use. Family History: family history is not on file. The patients home medications have been reviewed. Allergies: Patient has no known allergies. ---------------------------------------------------PHYSICAL EXAM--------------------------------------    Constitutional/General: Alert and oriented x3, well appearing, non toxic in NAD  Head: Normocephalic and atraumatic  Mouth: Oropharynx clear, handling secretions, no trismus  Neck: Supple, full ROM,  Pulmonary: Lungs clear to auscultation bilaterally, no wheezes, rales, or rhonchi. Not in respiratory distress  Cardiovascular:  Regular rate. Regular rhythm. No murmurs  Chest: no chest wall tenderness  Abdomen: Soft. Non tender. Non distended. No rebound, guarding, or rigidity. No pulsatile masses appreciated. Musculoskeletal: Moves all extremities x 4. Warm and well perfused, no clubbing, cyanosis, or edema. Capillary refill <3 seconds  Skin: warm and dry. No rashes. Neurologic: GCS 15, no gross focal neurologic deficits  Psych: Normal Affect    -------------------------------------------------- RESULTS -------------------------------------------------  I have personally reviewed all laboratory and imaging results for this patient. Results are listed below.      LABS:  Results for orders placed or performed during the hospital encounter of 06/16/22   CBC with Auto Differential   Result Value Ref Range    WBC 6.2 4.5 - 11.5 E9/L    RBC 5.63 3.80 - 5.80 E12/L    Hemoglobin 14.1 12.5 - 16.5 g/dL    Hematocrit 43.3 37.0 - 54.0 %    MCV 76.9 (L) 80.0 - 99.9 fL    MCH 25.0 (L) 26.0 - 35.0 pg    MCHC 32.6 32.0 - 34.5 %    RDW 15.1 (H) 11.5 - 15.0 fL    Platelets 294 812 - 038 E9/L    MPV 9.5 7.0 - 12.0 fL    Neutrophils % 47.7 43.0 - 80.0 %    Immature Granulocytes % 0.2 0.0 - 5.0 %    Lymphocytes % 38.3 20.0 - 42.0 %    Monocytes % 12.6 (H) 2.0 - 12.0 %    Eosinophils % 0.6 0.0 - 6.0 %    Basophils % 0.6 0.0 - 2.0 %    Neutrophils Absolute 2.95 1.80 - 7.30 E9/L    Immature Granulocytes # 0.01 E9/L    Lymphocytes Absolute 2.37 1.50 - 4.00 E9/L    Monocytes Absolute 0.78 0.10 - 0.95 E9/L    Eosinophils Absolute 0.04 (L) 0.05 - 0.50 E9/L    Basophils Absolute 0.04 0.00 - 0.20 I2/M   Basic Metabolic Panel w/ Reflex to MG   Result Value Ref Range    Sodium 138 132 - 146 mmol/L    Potassium reflex Magnesium 3.8 3.5 - 5.0 mmol/L    Chloride 100 98 - 107 mmol/L    CO2 20 (L) 22 - 29 mmol/L    Anion Gap 18 (H) 7 - 16 mmol/L    Glucose 96 74 - 99 mg/dL    BUN 10 6 - 20 mg/dL    CREATININE 1.0 0.7 - 1.2 mg/dL    GFR Non-African American >60 >=60 mL/min/1.73    GFR African American >60     Calcium 8.4 (L) 8.6 - 10.2 mg/dL   Troponin   Result Value Ref Range    Troponin, High Sensitivity 7 0 - 11 ng/L   EKG 12 Lead   Result Value Ref Range    Ventricular Rate 95 BPM    Atrial Rate 95 BPM    P-R Interval 162 ms    QRS Duration 86 ms    Q-T Interval 340 ms    QTc Calculation (Bazett) 427 ms    P Axis 57 degrees    R Axis 92 degrees    T Axis 30 degrees       RADIOLOGY:  Interpreted by Radiologist.  XR CHEST PORTABLE   Final Result   No acute process               EKG:  This EKG is signed and interpreted by me. Normal sinus rhythm rate of 95, no ST segment elevation or depression, SC interval 162 MS, QRS 86 MS,  MS  Interpreted by me      ------------------------- NURSING NOTES AND VITALS REVIEWED ---------------------------   The nursing notes within the ED encounter and vital signs as below have been reviewed by myself. BP (!) 150/111   Pulse 90   Temp 97.9 °F (36.6 °C)   Resp 16   SpO2 99%   Oxygen Saturation Interpretation: Normal    The patients available past medical records and past encounters were reviewed.         ------------------------------ ED COURSE/MEDICAL DECISION MAKING----------------------  Medications - No data to display          Medical Decision Making:   I, Dr. Jayshree Boss am the primary physician of record. Alva Prado is a 39 y.o. male who presents to the ED for palpitations. The patient did have labs and imaging which were reviewed. Work-up reassuring. Patient is likely experiencing palpitations secondary to anxiety. Patient will be discharged on Vistaril. He does have upcoming appointment to get established with a primary care physician. He will follow-up outpatient. This patient's ED course included: History, physical examination, reevaluation prior to disposition, labs, imaging, telemetry monitoring, EKG     This patient has remained hemodynamically stable during their ED course. Counseling: The emergency provider has spoken with the patient and discussed todays results, in addition to providing specific details for the plan of care and counseling regarding the diagnosis and prognosis. Questions are answered at this time and they are agreeable with the plan.       --------------------------------- IMPRESSION AND DISPOSITION ---------------------------------    IMPRESSION  1. Palpitations    2. Anxiety state        DISPOSITION  Disposition: Discharge to home  Patient condition is stable        NOTE: This report was transcribed using voice recognition software.  Every effort was made to ensure accuracy; however, inadvertent computerized transcription errors may be present         Arlyn Hines DO  06/16/22 0856

## 2022-06-16 NOTE — ED NOTES
Department of Emergency Medicine  FIRST PROVIDER TRIAGE NOTE             [facility]           6/16/22  3:31 AM EDT    Date of Encounter: 6/16/22   MRN: [id number]      HPI: Erick Paige is a 39 y.o. male who presents to the ED for Palpitations (states heart has been feeling like it is racing and beating irregular since waking up this morning. Pt admits to having a history of anxiety. )     ROS: Negative for fever or cough. PE: Gen Appearance/Constitutional: alert  Musculoskeletal: moves all extremities x 4     Initial Plan of Care: All treatment areas with department are currently occupied. Plan to order/Initiate the following while awaiting opening in ED: labs, EKG and imaging studies.   Initiate Treatment-Testing, Proceed toTreatment Area When Bed Available for ED Attending/MLP to Continue Care    Electronically signed by Kwasi Tierney PA-C   DD: 6/16/22         Kwasi Tierney PA-C  06/16/22 [id number]    ATTENDING PROVIDER ATTESTATION:     Supervising Physician, on-site, available for consultation, non-participatory in the evaluation or care of this patient         Ro Felipe MD  06/16/22 [id number]

## 2022-06-27 ENCOUNTER — OFFICE VISIT (OUTPATIENT)
Dept: FAMILY MEDICINE CLINIC | Age: 37
End: 2022-06-27
Payer: COMMERCIAL

## 2022-06-27 VITALS
RESPIRATION RATE: 12 BRPM | HEART RATE: 107 BPM | SYSTOLIC BLOOD PRESSURE: 129 MMHG | BODY MASS INDEX: 28.31 KG/M2 | OXYGEN SATURATION: 98 % | HEIGHT: 71 IN | DIASTOLIC BLOOD PRESSURE: 85 MMHG | TEMPERATURE: 97.5 F | WEIGHT: 202.2 LBS

## 2022-06-27 DIAGNOSIS — Z13.31 POSITIVE DEPRESSION SCREENING: ICD-10-CM

## 2022-06-27 DIAGNOSIS — Z72.0 TOBACCO USE: ICD-10-CM

## 2022-06-27 DIAGNOSIS — Z11.4 ENCOUNTER FOR SCREENING FOR HIV: ICD-10-CM

## 2022-06-27 DIAGNOSIS — E87.20 ACIDOSIS: ICD-10-CM

## 2022-06-27 DIAGNOSIS — R00.2 PALPITATIONS: ICD-10-CM

## 2022-06-27 DIAGNOSIS — F10.20 ALCOHOLISM (HCC): ICD-10-CM

## 2022-06-27 DIAGNOSIS — R00.2 PALPITATIONS: Primary | ICD-10-CM

## 2022-06-27 DIAGNOSIS — Z11.59 ENCOUNTER FOR HEPATITIS C SCREENING TEST FOR LOW RISK PATIENT: ICD-10-CM

## 2022-06-27 LAB
ANION GAP SERPL CALCULATED.3IONS-SCNC: 15 MMOL/L (ref 7–16)
BUN BLDV-MCNC: 12 MG/DL (ref 6–20)
CALCIUM SERPL-MCNC: 9.5 MG/DL (ref 8.6–10.2)
CHLORIDE BLD-SCNC: 104 MMOL/L (ref 98–107)
CO2: 21 MMOL/L (ref 22–29)
CREAT SERPL-MCNC: 0.9 MG/DL (ref 0.7–1.2)
GFR AFRICAN AMERICAN: >60
GFR NON-AFRICAN AMERICAN: >60 ML/MIN/1.73
GLUCOSE BLD-MCNC: 90 MG/DL (ref 74–99)
MAGNESIUM: 2.6 MG/DL (ref 1.6–2.6)
POTASSIUM SERPL-SCNC: 4.8 MMOL/L (ref 3.5–5)
SODIUM BLD-SCNC: 140 MMOL/L (ref 132–146)
TSH SERPL DL<=0.05 MIU/L-ACNC: 0.91 UIU/ML (ref 0.27–4.2)

## 2022-06-27 PROCEDURE — 90677 PCV20 VACCINE IM: CPT | Performed by: FAMILY MEDICINE

## 2022-06-27 PROCEDURE — 99213 OFFICE O/P EST LOW 20 MIN: CPT | Performed by: FAMILY MEDICINE

## 2022-06-27 PROCEDURE — 6360000002 HC RX W HCPCS

## 2022-06-27 PROCEDURE — G0009 ADMIN PNEUMOCOCCAL VACCINE: HCPCS

## 2022-06-27 PROCEDURE — 90670 PCV13 VACCINE IM: CPT

## 2022-06-27 PROCEDURE — G8419 CALC BMI OUT NRM PARAM NOF/U: HCPCS | Performed by: FAMILY MEDICINE

## 2022-06-27 PROCEDURE — 90471 IMMUNIZATION ADMIN: CPT | Performed by: FAMILY MEDICINE

## 2022-06-27 PROCEDURE — 4004F PT TOBACCO SCREEN RCVD TLK: CPT | Performed by: FAMILY MEDICINE

## 2022-06-27 PROCEDURE — G8427 DOCREV CUR MEDS BY ELIG CLIN: HCPCS | Performed by: FAMILY MEDICINE

## 2022-06-27 PROCEDURE — 99214 OFFICE O/P EST MOD 30 MIN: CPT | Performed by: FAMILY MEDICINE

## 2022-06-27 RX ORDER — SERTRALINE HYDROCHLORIDE 25 MG/1
25 TABLET, FILM COATED ORAL DAILY
Qty: 30 TABLET | Refills: 1 | Status: SHIPPED
Start: 2022-06-27 | End: 2022-07-26

## 2022-06-27 SDOH — ECONOMIC STABILITY: FOOD INSECURITY: WITHIN THE PAST 12 MONTHS, THE FOOD YOU BOUGHT JUST DIDN'T LAST AND YOU DIDN'T HAVE MONEY TO GET MORE.: NEVER TRUE

## 2022-06-27 SDOH — ECONOMIC STABILITY: FOOD INSECURITY: WITHIN THE PAST 12 MONTHS, YOU WORRIED THAT YOUR FOOD WOULD RUN OUT BEFORE YOU GOT MONEY TO BUY MORE.: NEVER TRUE

## 2022-06-27 ASSESSMENT — COLUMBIA-SUICIDE SEVERITY RATING SCALE - C-SSRS
6. HAVE YOU EVER DONE ANYTHING, STARTED TO DO ANYTHING, OR PREPARED TO DO ANYTHING TO END YOUR LIFE?: NO
1. WITHIN THE PAST MONTH, HAVE YOU WISHED YOU WERE DEAD OR WISHED YOU COULD GO TO SLEEP AND NOT WAKE UP?: NO
2. HAVE YOU ACTUALLY HAD ANY THOUGHTS OF KILLING YOURSELF?: NO

## 2022-06-27 ASSESSMENT — PATIENT HEALTH QUESTIONNAIRE - PHQ9
SUM OF ALL RESPONSES TO PHQ QUESTIONS 1-9: 22
6. FEELING BAD ABOUT YOURSELF - OR THAT YOU ARE A FAILURE OR HAVE LET YOURSELF OR YOUR FAMILY DOWN: 3
1. LITTLE INTEREST OR PLEASURE IN DOING THINGS: 3
SUM OF ALL RESPONSES TO PHQ QUESTIONS 1-9: 22
3. TROUBLE FALLING OR STAYING ASLEEP: 3
5. POOR APPETITE OR OVEREATING: 3
7. TROUBLE CONCENTRATING ON THINGS, SUCH AS READING THE NEWSPAPER OR WATCHING TELEVISION: 2
SUM OF ALL RESPONSES TO PHQ QUESTIONS 1-9: 20
9. THOUGHTS THAT YOU WOULD BE BETTER OFF DEAD, OR OF HURTING YOURSELF: 2
2. FEELING DOWN, DEPRESSED OR HOPELESS: 3
10. IF YOU CHECKED OFF ANY PROBLEMS, HOW DIFFICULT HAVE THESE PROBLEMS MADE IT FOR YOU TO DO YOUR WORK, TAKE CARE OF THINGS AT HOME, OR GET ALONG WITH OTHER PEOPLE: 0
SUM OF ALL RESPONSES TO PHQ9 QUESTIONS 1 & 2: 6
SUM OF ALL RESPONSES TO PHQ QUESTIONS 1-9: 22
8. MOVING OR SPEAKING SO SLOWLY THAT OTHER PEOPLE COULD HAVE NOTICED. OR THE OPPOSITE, BEING SO FIGETY OR RESTLESS THAT YOU HAVE BEEN MOVING AROUND A LOT MORE THAN USUAL: 0
4. FEELING TIRED OR HAVING LITTLE ENERGY: 3

## 2022-06-27 ASSESSMENT — SOCIAL DETERMINANTS OF HEALTH (SDOH): HOW HARD IS IT FOR YOU TO PAY FOR THE VERY BASICS LIKE FOOD, HOUSING, MEDICAL CARE, AND HEATING?: NOT HARD AT ALL

## 2022-06-27 NOTE — PATIENT INSTRUCTIONS
Please attempt to cut back on your alcohol use and tobacco use. Alcohol may make you feel like your anxiety and depression are controlled, but it actually worsens it. Cut back slowly, by 1/4 a pint per week. If you notice symptoms like higher blood pressure, shakes, sweating, call our office. If you cannot contact us, go to the nearest Emergency room. Patient Education      Patient Education        Quitting Tobacco: Care Instructions  Overview     People who use tobacco crave the nicotine in tobacco. Giving it up is much harder than simply changing a habit. Your body has to stop craving the nicotine. It is hard to quit, but you can do it. There are many tools thatpeople use to quit. You may find that combining tools works best for you. There are several steps to quitting. Your doctor will help you set up the plan that best meets your needs. You may want to attend a tobacco-cessation program to help you quit. When you choose a program, look for one that has proven success. Ask your doctor for ideas. You will greatly increase your chances of success if you take medicine as well as get counseling or join a cessationprogram.  Some of the changes you feel when you first quit tobacco are uncomfortable. Your body will miss the nicotine at first, and you may feel short-tempered and grumpy. You may have trouble sleeping or concentrating. Medicine can help you deal with these symptoms. You may struggle with changing your habits. And theurge to use tobacco may continue for a time. This may be a lot to deal with, but keep at it. You will feel better. Follow-up care is a key part of your treatment and safety. Be sure to make and go to all appointments, and call your doctor if you are having problems. It's also a good idea to know your test results and keep alist of the medicines you take. How can you care for yourself at home?  Get support. You have a better chance of quitting if you have help and support. ?  Ask your family, friends, and coworkers for support. ? Join a support group, such as Nicotine Anonymous, for people who are trying to quit using tobacco.  ? Consider signing up for a tobacco cessation program, such as the American Lung Association's Freedom from Smoking program.  ? Get text messaging support. Go to the website at www.smokefree. gov to sign up for the [facility] program.   After you quit, do not use tobacco again, not even once. Get rid of all tobacco products and anything that reminds you of tobacco, like ashtrays, spit cups, and lighters. If you smoke, clean your house and your clothes to get rid of the smell.  Learn how to live without tobacco. Think about ways you can avoid those things that make you reach for tobacco.  ? Avoid situations that put you at greatest risk. For some people, it's hard to have a drink with friends or a coffee break with coworkers without using tobacco.  ? Change your daily routine. Take a different route to work, or eat a meal in a different place.  Try to cut down on stress. Calm yourself or release tension by doing an activity you enjoy, such as reading a book, taking a hot bath, or gardening.  Learn about treatments that can help you quit. ? Talk to your doctor or pharmacist about nicotine replacement therapy. Nicotine replacement products help you slowly reduce the amount of nicotine you need. They can help you deal with cravings and withdrawal symptoms. These products include nicotine patches, gum, lozenges, nasal spray, and inhalers. Most are available without a prescription. ? Ask your doctor about varenicline (Chantix) or bupropion SR. These prescription medicines can help reduce withdrawal symptoms. They don't contain nicotine.  Eat a healthy diet, and get regular exercise. Having healthy habits will help your body move past its craving for nicotine.  Be prepared to keep trying. Most people aren't successful the first few times they try to quit.  Don't money you save? Take a break. Choose a day or two each week when you won't drink at all. Notice how you feel on those days, physically and emotionally. How did you sleep? Do you feelbetter? Over time, add more break days. Count calories. Would you like to lose some weight? That can be a good motivator for cutting back. Figure out how many calories are in each drink. How many does that add upto in a day? In a week? In a month? Practice saying no. Be ready when someone offers you a drink. Try: Malini Yang, I've had enough. \" Or \"Thanks, but I'm cutting back. \" Or \"No, thanks. I feel better when I drinkless. \"   Current as of: November 8, 2021               Content Version: 13.3  © 2006-2022 [employer]. Care instructions adapted under license by [facility] ([facility]). If you have questions about a medical condition or this instruction, always ask your healthcare professional. [address] any warranty or liability for your use of this information.

## 2022-06-27 NOTE — PROGRESS NOTES
S: 39 y.o. male here for chest pain intermittent, palpitations. EKG in ED with nsr. Cp not related to exertion, can exercise w/o cp. Substernal.sometimes sharp. Associated with anxiety. Recently incarcerated for 15 yrs. Depression. No SI. Anxiety. Racing thoughts. 2 pints EtOH per day. Smoker. HTN. Was being treated in [facility] for it. Controlled w/o meds today. O: VS: /85 (Site: Left Upper Arm, Position: Sitting, Cuff Size: Large Adult)   Pulse (!) 107   Temp 97.5 °F (36.4 °C) (Temporal)   Resp 12   Ht 5' 11\" (1.803 m)   Wt 202 lb 3.2 oz (91.7 kg)   SpO2 98%   BMI 28.20 kg/m²    General: NAD, alert and interacting appropriately. CV:  RRR, no gallops, rubs, or murmurs    Resp: CTAB   Abd:  Soft, nontender   Ext:  No edema    Impression: cp and palps likely 2/2 anxiety likely 2/2 DANIEL vs EtOH abuse. Depression. Plan:   Pt contemplative re cutting EtOH   zoloft 25  holter 48  rtc 1 mo for anxiety/depression/EtOH abuse. Attending Physician Statement  I have discussed the case, including pertinent history and exam findings with the resident. I agree with the documented assessment and plan.

## 2022-06-27 NOTE — PROGRESS NOTES
[address]   Department of Family Medicine  Family Medicine Residency Program      Patient:  Jodee Butt 39 y.o. male          Chief complaint:   Chief Complaint   Patient presents with    New Patient     Patient is here to become established as a new patient today. Medications have been reviewed and was taking Anxiety meds he was given at ER but he only took them for 3 days and stopped because he did not feel a change. Recently got out of [facility] and is going through a lot emotionally trying to get back on track.  Hypertension     ER recently due to elevated bp of 166/122. Thinks that maybe it might have been due to a panic attack.  Anxiety     Prescribed anxiety meds but did not help-. Currently is poisitive for PHQ9/Depression. History of Present Illness   The patient is a 39 y.o. male with a PMH of hypertension who presents to the clinic for the following medical concerns:     Chest pain: Has been working out more doing pushups. Recently out [facility] and trying to lose weight. Does have panic attacks and had them in [facility]. Was seen in ED 6/16/22 for same symptoms and EKG and trop were WNL. Does have racing thoughts at night and this is when his symptoms come on. Feels like the symptoms come on with his anxiety. Not happening when he exerts himself.  Possible hypertension: Has high BP sometimes but only when he is anxious. Was on BP med when incarcerated but isn't currently on it. Not checking BP at home.  Tobacco use: not ready to quit.  EtOH use: drinking daily. Pint -2 of liquor daily. Contemplative on cutting back. Never had withdrawal in past. Wants to do it on his own.  Depression: Has daily sx with anhedonia, feeling down, insomnia. Never tried SSRI. Interested in counseling. Health maintenance:  Prevnar 20 today    Past Medical History:  History reviewed. No pertinent past medical history. Past Surgical History:    History reviewed.  No pertinent surgical history. Allergies:    Patient has no known allergies. Social History:   Social History     Tobacco Use    Smoking status: Current Every Day Smoker     Packs/day: 1.00    Smokeless tobacco: Never Used   Substance Use Topics    Alcohol use: Yes        Family History:       Problem Relation Age of Onset    Diabetes Mother     High Blood Pressure Mother     Diabetes Father     Cancer Sister     Cancer Maternal Grandfather        Reviewof Systems:   Review of Systems Negative unless otherwise stated in HPI. Physical Exam   Vitals: /85 (Site: Left Upper Arm, Position: Sitting, Cuff Size: Large Adult)   Pulse (!) 107   Temp 97.5 °F (36.4 °C) (Temporal)   Resp 12   Ht 5' 11\" (1.803 m)   Wt 202 lb 3.2 oz (91.7 kg)   SpO2 98%   BMI 28.20 kg/m²        Physical Exam  Constitutional:       Appearance: Normal appearance. HENT:      Head: Normocephalic and atraumatic. Mouth/Throat:      Mouth: Mucous membranes are moist.      Pharynx: Oropharynx is clear. Eyes:      Extraocular Movements: Extraocular movements intact. Conjunctiva/sclera: Conjunctivae normal.   Cardiovascular:      Rate and Rhythm: Normal rate and regular rhythm. Heart sounds: No murmur heard. No gallop. Pulmonary:      Effort: Pulmonary effort is normal.      Breath sounds: Normal breath sounds. Abdominal:      General: Abdomen is flat. Palpations: Abdomen is soft. Musculoskeletal:         General: No swelling or tenderness. Cervical back: Normal range of motion and neck supple. Right lower leg: No edema. Left lower leg: No edema. Skin:     General: Skin is warm and dry. Neurological:      Mental Status: He is alert and oriented to person, place, and time. Mental status is at baseline. Psychiatric:         Mood and Affect: Mood normal.         Thought Content:  Thought content normal.          Assessment and Plan       Palpitations  In ER, had negative troponin, EKG WNL  - TSH; Future  - Magnesium; Future  - Holter Monitor 48 Hour; Future  Consider echo? Positive depression screening  Trial SSRI  To start counseling  - sertraline (ZOLOFT) 25 MG tablet daily  PHQ-9 score today: (PHQ-9 Total Score: 22), additional evaluation and assessment performed, follow-up plan includes but not limited to: Medication management and Referral to /Specialist  for evaluation and management. Tobacco use  [facility] tobacco center referral    Alcoholism ([facility] [address]  Contemplative  Slow taper advised  Withdrawal symptoms explained, to go to ER if significantly symptomatic  Peer recovery information given    Encounter for hepatitis C screening test for low risk patient  - Hepatitis C Antibody; Future    Encounter for screening for HIV  - HIV Screen; Future    Acidosis  Had low CO2 on prior lab, recheck  - Basic Metabolic Panel; Future  - Hemoglobin A1C; Future     Please see Patient Instructions for further counseling and information given. Advised to please be adherent to the treatment plans discussed today, and please call with any questions or concerns, letting the office know of any reasons that the plans may not be followed. The risks of untreated conditions include worsening illness, injury, disability, and possibly, death. Please call if symptoms change in any way, worsen, or fail to completely resolve, as this could necessitate a change to treatment plans. Patient and/or caregiver expressed understanding. Indications and proper use of medication(s) reviewed. Potential side-effects and risks of medication(s) also explained. Patient and/or caregiver was instructed to call if any new symptoms develop prior to next visit. Health risk factors discussed and addressed. Return to Office: Return in about 1 month (around 7/27/2022).       Medication List:    Current Outpatient Medications   Medication Sig Dispense Refill    sertraline (ZOLOFT) 25 MG tablet Take 1 tablet by mouth daily 30 tablet 1    fluticasone (FLONASE) 50 MCG/ACT nasal spray 2 sprays by Each Nostril route daily (Patient not taking: Reported on 6/16/2022) 16 g 0    ondansetron (ZOFRAN ODT) 4 MG disintegrating tablet Take 1 tablet by mouth every 8 hours as needed for Nausea or Vomiting (Patient not taking: Reported on 6/16/2022) 10 tablet 0     No current facility-administered medications for this visit.         Lorie Tim MD     Electronically signed by Lorie Tim MD on 6/27/2022 at 4:07 PM

## 2022-06-28 LAB
HEPATITIS C ANTIBODY INTERPRETATION: NORMAL
HIV-1 AND HIV-2 ANTIBODIES: NORMAL

## 2022-07-15 ENCOUNTER — HOSPITAL ENCOUNTER (OUTPATIENT)
Dept: SLEEP CENTER | Age: 37
Discharge: HOME | End: 2022-07-15
Payer: COMMERCIAL

## 2022-07-15 DIAGNOSIS — R00.0 TACHYCARDIA: Primary | ICD-10-CM

## 2022-07-15 DIAGNOSIS — R00.2 PALPITATIONS: ICD-10-CM

## 2022-07-15 PROCEDURE — 93226 XTRNL ECG REC<48 HR SCAN A/R: CPT

## 2022-07-15 PROCEDURE — 93225 XTRNL ECG REC<48 HRS REC: CPT

## 2022-07-26 ENCOUNTER — OFFICE VISIT (OUTPATIENT)
Dept: FAMILY MEDICINE CLINIC | Age: 37
End: 2022-07-26
Payer: COMMERCIAL

## 2022-07-26 VITALS
DIASTOLIC BLOOD PRESSURE: 107 MMHG | HEIGHT: 71 IN | OXYGEN SATURATION: 99 % | BODY MASS INDEX: 28.14 KG/M2 | HEART RATE: 124 BPM | SYSTOLIC BLOOD PRESSURE: 158 MMHG | TEMPERATURE: 98 F | WEIGHT: 201 LBS

## 2022-07-26 DIAGNOSIS — Z13.31 POSITIVE DEPRESSION SCREENING: ICD-10-CM

## 2022-07-26 DIAGNOSIS — I10 HYPERTENSION, UNSPECIFIED TYPE: Primary | ICD-10-CM

## 2022-07-26 PROCEDURE — G8427 DOCREV CUR MEDS BY ELIG CLIN: HCPCS | Performed by: FAMILY MEDICINE

## 2022-07-26 PROCEDURE — 99212 OFFICE O/P EST SF 10 MIN: CPT | Performed by: FAMILY MEDICINE

## 2022-07-26 PROCEDURE — 99213 OFFICE O/P EST LOW 20 MIN: CPT | Performed by: FAMILY MEDICINE

## 2022-07-26 PROCEDURE — G8419 CALC BMI OUT NRM PARAM NOF/U: HCPCS | Performed by: FAMILY MEDICINE

## 2022-07-26 PROCEDURE — 4004F PT TOBACCO SCREEN RCVD TLK: CPT | Performed by: FAMILY MEDICINE

## 2022-07-26 RX ORDER — METOPROLOL SUCCINATE 25 MG/1
25 TABLET, EXTENDED RELEASE ORAL DAILY
Qty: 30 TABLET | Refills: 0 | Status: SHIPPED | OUTPATIENT
Start: 2022-07-26

## 2022-07-26 RX ORDER — SERTRALINE HYDROCHLORIDE 25 MG/1
25 TABLET, FILM COATED ORAL DAILY
Qty: 30 TABLET | Refills: 1 | Status: CANCELLED | OUTPATIENT
Start: 2022-07-26 | End: 2022-08-25

## 2022-07-26 NOTE — PROGRESS NOTES
[address] [facility]  Department of Family Medicine  Family Medicine Residency Program      Patient:  Rosa Burks 39 y.o. male          Chief complaint:   Chief Complaint   Patient presents with    Palpitations    Results         History of Present Illness   The patient is a 39 y.o. male with a PMH of hypertension who presents to the clinic for the following medical concerns:    Chest pain: Has been working out more doing pushups. Recently out of [facility] and trying to lose weight. Does have panic attacks and had them in [facility]. Was seen in ED 6/16/22 for same symptoms and EKG and trop were WNL. Does have racing thoughts at night and this is when his symptoms come on. Feels like the symptoms come on with his anxiety. Not happening when he exerts himself. Possible hypertension: Has high BP sometimes but only when he is anxious. Was on BP med when incarcerated but isn't currently on it. Not checking BP at home. Tobacco use: not ready to quit. EtOH use: drinking daily. Cut back to 2 beers daily. Was drinking Pint -2 of liquor daily, but stopped 1-2 weeks ago. Contemplative on cutting back. Never had withdrawal in past. Wants to do it on his own. Depression: Has daily sx with anhedonia, feeling down, insomnia. Tried zoloft 25 last month and not helping but no SE. Interested in counseling. Health maintenance:  Does not want to do Tdap today    Past Medical History:  No past medical history on file. Past Surgical History:    No past surgical history on file. Allergies:    Patient has no known allergies.     Social History:   Social History     Tobacco Use    Smoking status: Every Day     Packs/day: 1.00     Types: Cigarettes    Smokeless tobacco: Never   Substance Use Topics    Alcohol use: Yes        Family History:       Problem Relation Age of Onset    Diabetes Mother     High Blood Pressure Mother     Diabetes Father     Cancer Sister     Cancer Maternal Grandfather        Reviewof Systems: Review of Systems Negative unless otherwise stated in HPI. Physical Exam   Vitals: BP (!) 158/107 (Site: Right Upper Arm, Position: Sitting, Cuff Size: Large Adult)   Pulse (!) 124   Temp 98 °F (36.7 °C) (Temporal)   Ht 5' 11\" (1.803 m)   Wt 201 lb (91.2 kg)   SpO2 99%   BMI 28.03 kg/m²        Physical Exam  Constitutional:       Appearance: Normal appearance. HENT:      Head: Normocephalic and atraumatic. Mouth/Throat:      Mouth: Mucous membranes are moist.      Pharynx: Oropharynx is clear. Eyes:      Extraocular Movements: Extraocular movements intact. Conjunctiva/sclera: Conjunctivae normal.   Cardiovascular:      Rate and Rhythm: Normal rate and regular rhythm. Heart sounds: No murmur heard. No gallop. Pulmonary:      Effort: Pulmonary effort is normal.      Breath sounds: Normal breath sounds. Abdominal:      General: Abdomen is flat. Palpations: Abdomen is soft. Musculoskeletal:         General: No swelling or tenderness. Cervical back: Normal range of motion and neck supple. Right lower leg: No edema. Left lower leg: No edema. Skin:     General: Skin is warm and dry. Neurological:      Mental Status: He is alert and oriented to person, place, and time. Mental status is at baseline. Psychiatric:         Mood and Affect: Mood normal.         Thought Content:  Thought content normal.        Assessment and Plan       Palpitations  In ER, had negative troponin, EKG WNL  No further chest pain  TSH and magnesium within normal limits  - Holter Monitor 48 Hour; showing SVT when patient was symptomatic  Start metoprolol 25 mg daily    Hypertension  Starting metoprolol 25 mg daily for above    Positive depression screening  Still not well controlled  To start counseling  Increase Zoloft to 50 mg daily  PHQ-9 score (PHQ-9 Total Score: 22), additional evaluation and assessment performed, follow-up plan includes but not limited to: Medication management and Referral to /Specialist  for evaluation and management. Tobacco use  [facility] referral    Alcoholism ([facility] [address]  Contemplative  Slow taper advised  Withdrawal symptoms explained, to go to ER if significantly symptomatic  Peer recovery information given       Please see Patient Instructions for further counseling and information given. Advised to please be adherent to the treatment plans discussed today, and please call with any questions or concerns, letting the office know of any reasons that the plans may not be followed. The risks of untreated conditions include worsening illness, injury, disability, and possibly, death. Please call if symptoms change in any way, worsen, or fail to completely resolve, as this could necessitate a change to treatment plans. Patient and/or caregiver expressed understanding. Indications and proper use of medication(s) reviewed. Potential side-effects and risks of medication(s) also explained. Patient and/or caregiver was instructed to call if any new symptoms develop prior to next visit. Health risk factors discussed and addressed. Return to Office: No follow-ups on file. Medication List:    Current Outpatient Medications   Medication Sig Dispense Refill    sertraline (ZOLOFT) 25 MG tablet Take 1 tablet by mouth daily 30 tablet 1    fluticasone (FLONASE) 50 MCG/ACT nasal spray 2 sprays by Each Nostril route daily (Patient not taking: Reported on 6/16/2022) 16 g 0     No current facility-administered medications for this visit.         Shaheed Villar MD     Electronically signed by Shaheed Villar MD on 7/26/2022 at 1:17 PM

## 2022-07-26 NOTE — PROGRESS NOTES
Patient is a 59-year-old male with a past medical history of alcohol use disorder, tobacco use disorder, depression, palpitations who presents today for follow-up on these chronic complaints. Depression: Patient was started on Zoloft 25 mg at last visit. He has not had any negative side effects from this, however he does note that his symptoms have not improved at all yet. He has not yet seen a counselor. Alcohol use disorder: Patient has cut back on his alcohol consumption. He was drinking 2 pints of liquor daily until about 2 weeks ago when he had cut back to only 2 beers daily. Palpitations: Patient had been having intermittent palpitations or feeling like his heart was racing multiple times over the past couple months. Patient has undergone Holter monitor testing which only showed an episode of SVT when he was symptomatic. Patient is still getting the same symptoms despite being at rest.  He does not feel like the symptoms change according to his alcohol consumption. Elevated blood pressure: Patient has had elevated blood pressure in the past for which she thinks he was on HCTZ. He has not been on this in some months. Last blood pressure here was within normal limits. Patient denies any breathing trouble, vision changes, headache. He does get occasional twinges of sharp chest pain that lasts a few seconds. This pain is nonradiating. Blood pressure (!) 158/107, pulse (!) 124, temperature 98 °F (36.7 °C), temperature source Temporal, height 5' 11\" (1.803 m), weight 201 lb (91.2 kg), SpO2 99 %. HEENT WNL     Heart regular but tachycardic lungs clear    abd non-tender      No edema    Pulses intact     Assessment and plan:  Depression: Will increase sertraline to 50 mg nightly. Alcohol use disorder: Patient is not interested in help with this. He has cut back on his own to 2 beers daily.   Patient has been advised of signs and symptoms to look out for with withdrawal.    Palpitations: Holter monitor showing SVT while symptomatic. Patient is tachycardic here in the room today at rest.  We will start 25 mg daily metoprolol. Could be related to alcohol consumption versus withdrawal.  Hydration advised. Elevated blood pressure: Start metoprolol as above. Consider up titration in future if needed. Patient advised to check home blood pressure. Follow-up 2 weeks.

## 2022-07-29 PROBLEM — I10 HYPERTENSION: Status: ACTIVE | Noted: 2022-07-29

## 2023-12-23 ENCOUNTER — HOSPITAL ENCOUNTER (EMERGENCY)
Age: 38
Discharge: HOME | End: 2023-12-23
Attending: EMERGENCY MEDICINE
Payer: COMMERCIAL

## 2023-12-23 ENCOUNTER — APPOINTMENT (OUTPATIENT)
Dept: CT IMAGING | Age: 38
End: 2023-12-23
Attending: EMERGENCY MEDICINE
Payer: COMMERCIAL

## 2023-12-23 VITALS
OXYGEN SATURATION: 99 % | RESPIRATION RATE: 14 BRPM | DIASTOLIC BLOOD PRESSURE: 91 MMHG | TEMPERATURE: 98.1 F | SYSTOLIC BLOOD PRESSURE: 133 MMHG | HEART RATE: 96 BPM

## 2023-12-23 DIAGNOSIS — M54.50 ACUTE LEFT-SIDED LOW BACK PAIN, UNSPECIFIED WHETHER SCIATICA PRESENT: ICD-10-CM

## 2023-12-23 DIAGNOSIS — M85.60 BONE CYST: Primary | ICD-10-CM

## 2023-12-23 PROCEDURE — 6360000002 HC RX W HCPCS: Performed by: EMERGENCY MEDICINE

## 2023-12-23 PROCEDURE — 72131 CT LUMBAR SPINE W/O DYE: CPT

## 2023-12-23 PROCEDURE — 6370000000 HC RX 637 (ALT 250 FOR IP): Performed by: EMERGENCY MEDICINE

## 2023-12-23 PROCEDURE — 99284 EMERGENCY DEPT VISIT MOD MDM: CPT

## 2023-12-23 PROCEDURE — 96372 THER/PROPH/DIAG INJ SC/IM: CPT

## 2023-12-23 RX ORDER — CYCLOBENZAPRINE HCL 5 MG
5 TABLET ORAL 2 TIMES DAILY PRN
Qty: 10 TABLET | Refills: 0 | Status: SHIPPED | OUTPATIENT
Start: 2023-12-23 | End: 2024-01-02

## 2023-12-23 RX ORDER — NAPROXEN 500 MG/1
500 TABLET ORAL 2 TIMES DAILY
Qty: 6 TABLET | Refills: 0 | Status: SHIPPED | OUTPATIENT
Start: 2023-12-23 | End: 2023-12-26

## 2023-12-23 RX ORDER — KETOROLAC TROMETHAMINE 30 MG/ML
30 INJECTION, SOLUTION INTRAMUSCULAR; INTRAVENOUS ONCE
Status: COMPLETED | OUTPATIENT
Start: 2023-12-23 | End: 2023-12-23

## 2023-12-23 RX ORDER — FENTANYL CITRATE 50 UG/ML
50 INJECTION, SOLUTION INTRAMUSCULAR; INTRAVENOUS ONCE
Status: COMPLETED | OUTPATIENT
Start: 2023-12-23 | End: 2023-12-23

## 2023-12-23 RX ORDER — ORPHENADRINE CITRATE 30 MG/ML
60 INJECTION INTRAMUSCULAR; INTRAVENOUS ONCE
Status: COMPLETED | OUTPATIENT
Start: 2023-12-23 | End: 2023-12-23

## 2023-12-23 RX ORDER — HYDROCODONE BITARTRATE AND ACETAMINOPHEN 5; 325 MG/1; MG/1
1 TABLET ORAL ONCE
Status: COMPLETED | OUTPATIENT
Start: 2023-12-23 | End: 2023-12-23

## 2023-12-23 RX ADMIN — ORPHENADRINE CITRATE 60 MG: 60 INJECTION INTRAMUSCULAR; INTRAVENOUS at 19:37

## 2023-12-23 RX ADMIN — FENTANYL CITRATE 50 MCG: 50 INJECTION, SOLUTION INTRAMUSCULAR; INTRAVENOUS at 22:08

## 2023-12-23 RX ADMIN — HYDROCODONE BITARTRATE AND ACETAMINOPHEN 1 TABLET: 5; 325 TABLET ORAL at 19:31

## 2023-12-23 RX ADMIN — KETOROLAC TROMETHAMINE 30 MG: 30 INJECTION, SOLUTION INTRAMUSCULAR; INTRAVENOUS at 19:33

## 2023-12-24 NOTE — ED TRIAGE NOTES
Department of Emergency Medicine  FIRST PROVIDER TRIAGE NOTE             Independent MLP           12/23/23  7:02 PM EST    Date of Encounter: 12/23/23   MRN: [id number]      HPI: Dmitry Vanegas is a 45 y.o. male who presents to the ED for Leg Pain (C/o bilateral leg pain starting last night. States lower back pain. States Hx scoliosis, denies numbness, states unknown of any injury to legs or back. )   Unable to ambulate. Daily etoh    ROS: Negative for fever or urinary complaints. PE: Gen Appearance/Constitutional: alert  HEENT: NC/NT. PERRLA,  Airway patent. Initial Plan of Care: All treatment areas with department are currently occupied. Plan to order/Initiate the following while awaiting opening in ED: .   Initiate Treatment-Testing, Proceed toTreatment Area When Bed Available for ED Attending/MLP to Continue Care    Electronically signed by BECKY Michelle CNP   DD: 12/23/23

## 2023-12-27 ENCOUNTER — TELEPHONE (OUTPATIENT)
Dept: FAMILY MEDICINE CLINIC | Age: 38
End: 2023-12-27

## 2023-12-27 NOTE — TELEPHONE ENCOUNTER
Patient called to schedule an ED F/U Appt w/Dr. Roxy Hancock for a bone cyst.  I informed patient that Dr. Roxy Hancock is not in the ofc. I reviewed his ED Discharge Summary and noted the advisement to make an appt w/Dr. Colleen Veras for the bone cyst.  I informed patient that I will check with Dr. Roxy Hancock regarding scheduling his appt w/her since there are no appts in the near future. Patient then stated he threw up blood yesterday which was brownish red. I advised patient to go to the ED. Patient stated he did just get through [facility] and was uncertain if related. I informed him that I could not answer this, but throwing up blood is not normal and should be evaluated.      Last seen 7/26/2022  Next appt Visit date not found

## 2023-12-28 NOTE — TELEPHONE ENCOUNTER
Per Dr. Natasha Holley -    This would be okay. Please ensure patient understands that I am no longer at [facility] as I have not seen him at this office. If he plans to stay at that clinic rather they would get him in with a new provider but otherwise we can schedule him. Thanks!

## 2023-12-28 NOTE — TELEPHONE ENCOUNTER
I called patient, and informed him that Dr. Cheryl Layton is in [place] now, and offered an appt.   Patient was agreeable and scheduled 1/2/24 at 4:20 pm.

## 2024-01-02 ENCOUNTER — TELEPHONE (OUTPATIENT)
Dept: FAMILY MEDICINE CLINIC | Age: 39
End: 2024-01-02

## 2024-01-02 NOTE — TELEPHONE ENCOUNTER
Spoke with patient due to missed appointment today.  Patient stated he was on [address] and would be here in a few minutes.    Spoke with PCP and she advised patient is past 15 minutes late and would need to reschedule.      I informed patient that we would need to reschedule appointment and offered soonest appointment for this Friday 1/5/24 at 4:20 pm and appointment was declined.  Girl in background asked if there was a sooner appointment or another provider that patient can be seen by because he is in a lot of pain.  I informed patient that 1/5/24 was soonest appointment for PCP or he could come in to walk in clinic to be seen.  Girl in background stated that they had paperwork that needed to be completed and then stated they were pulling up and would be in office soon.

## 2024-01-03 ENCOUNTER — HOSPITAL ENCOUNTER (INPATIENT)
Age: 39
LOS: 5 days | Discharge: HOME | DRG: 720 | End: 2024-01-09
Attending: EMERGENCY MEDICINE | Admitting: FAMILY MEDICINE
Payer: COMMERCIAL

## 2024-01-03 ENCOUNTER — OFFICE VISIT (OUTPATIENT)
Dept: FAMILY MEDICINE CLINIC | Age: 39
End: 2024-01-03
Payer: COMMERCIAL

## 2024-01-03 ENCOUNTER — APPOINTMENT (OUTPATIENT)
Dept: ULTRASOUND IMAGING | Age: 39
DRG: 720 | End: 2024-01-03
Payer: COMMERCIAL

## 2024-01-03 ENCOUNTER — APPOINTMENT (OUTPATIENT)
Dept: CT IMAGING | Age: 39
DRG: 720 | End: 2024-01-03
Payer: COMMERCIAL

## 2024-01-03 ENCOUNTER — TRANSCRIBE ORDERS (OUTPATIENT)
Dept: ADMINISTRATIVE | Age: 39
End: 2024-01-03

## 2024-01-03 VITALS
BODY MASS INDEX: 30.06 KG/M2 | TEMPERATURE: 98.7 F | HEIGHT: 70 IN | OXYGEN SATURATION: 98 % | WEIGHT: 210 LBS | SYSTOLIC BLOOD PRESSURE: 128 MMHG | HEART RATE: 128 BPM | RESPIRATION RATE: 18 BRPM | DIASTOLIC BLOOD PRESSURE: 84 MMHG

## 2024-01-03 DIAGNOSIS — M79.605 LEFT LEG PAIN: Primary | ICD-10-CM

## 2024-01-03 DIAGNOSIS — L02.91 ABSCESS: Primary | ICD-10-CM

## 2024-01-03 DIAGNOSIS — M79.89 LEG SWELLING: ICD-10-CM

## 2024-01-03 DIAGNOSIS — K92.0 HEMATEMESIS WITHOUT NAUSEA: ICD-10-CM

## 2024-01-03 DIAGNOSIS — Z76.0 MEDICATION REFILL: ICD-10-CM

## 2024-01-03 DIAGNOSIS — R00.0 TACHYCARDIA: ICD-10-CM

## 2024-01-03 DIAGNOSIS — R01.1 HEART MURMUR: ICD-10-CM

## 2024-01-03 DIAGNOSIS — D72.829 LEUKOCYTOSIS, UNSPECIFIED TYPE: ICD-10-CM

## 2024-01-03 DIAGNOSIS — M79.605 ACUTE PAIN OF LEFT LOWER EXTREMITY: Primary | ICD-10-CM

## 2024-01-03 LAB
ALBUMIN SERPL-MCNC: 3.2 G/DL (ref 3.5–5.2)
ALP SERPL-CCNC: 74 U/L (ref 40–129)
ALT SERPL-CCNC: 23 U/L (ref 0–40)
ANION GAP SERPL CALCULATED.3IONS-SCNC: 15 MMOL/L (ref 7–16)
AST SERPL-CCNC: 22 U/L (ref 0–39)
BASOPHILS # BLD: 0.05 K/UL (ref 0–0.2)
BASOPHILS NFR BLD: 0 % (ref 0–2)
BILIRUB SERPL-MCNC: 0.3 MG/DL (ref 0–1.2)
BUN SERPL-MCNC: 21 MG/DL (ref 6–20)
CALCIUM SERPL-MCNC: 9.1 MG/DL (ref 8.6–10.2)
CHLORIDE SERPL-SCNC: 97 MMOL/L (ref 98–107)
CO2 SERPL-SCNC: 20 MMOL/L (ref 22–29)
CREAT SERPL-MCNC: 0.7 MG/DL (ref 0.7–1.2)
EOSINOPHIL # BLD: 0.01 K/UL (ref 0.05–0.5)
EOSINOPHILS RELATIVE PERCENT: 0 % (ref 0–6)
ERYTHROCYTE [DISTWIDTH] IN BLOOD BY AUTOMATED COUNT: 14.5 % (ref 11.5–15)
GFR SERPL CREATININE-BSD FRML MDRD: >60 ML/MIN/1.73M2
GLUCOSE SERPL-MCNC: 120 MG/DL (ref 74–99)
HCT VFR BLD AUTO: 28.1 % (ref 37–54)
HGB BLD-MCNC: 9.4 G/DL (ref 12.5–16.5)
IMM GRANULOCYTES # BLD AUTO: 0.3 K/UL (ref 0–0.58)
IMM GRANULOCYTES NFR BLD: 1 % (ref 0–5)
LACTATE BLDV-SCNC: 2 MMOL/L (ref 0.5–2.2)
LYMPHOCYTES NFR BLD: 1.48 K/UL (ref 1.5–4)
LYMPHOCYTES RELATIVE PERCENT: 7 % (ref 20–42)
MCH RBC QN AUTO: 24.6 PG (ref 26–35)
MCHC RBC AUTO-ENTMCNC: 33.5 G/DL (ref 32–34.5)
MCV RBC AUTO: 73.6 FL (ref 80–99.9)
MONOCYTES NFR BLD: 1.58 K/UL (ref 0.1–0.95)
MONOCYTES NFR BLD: 8 % (ref 2–12)
NEUTROPHILS NFR BLD: 84 % (ref 43–80)
NEUTS SEG NFR BLD: 17.74 K/UL (ref 1.8–7.3)
PLATELET # BLD AUTO: 463 K/UL (ref 130–450)
PMV BLD AUTO: 9.7 FL (ref 7–12)
POTASSIUM SERPL-SCNC: 4.1 MMOL/L (ref 3.5–5)
PROT SERPL-MCNC: 7.2 G/DL (ref 6.4–8.3)
RBC # BLD AUTO: 3.82 M/UL (ref 3.8–5.8)
RBC # BLD: ABNORMAL 10*6/UL
SODIUM SERPL-SCNC: 132 MMOL/L (ref 132–146)
WBC OTHER # BLD: 21.2 K/UL (ref 4.5–11.5)

## 2024-01-03 PROCEDURE — 6370000000 HC RX 637 (ALT 250 FOR IP)

## 2024-01-03 PROCEDURE — 99285 EMERGENCY DEPT VISIT HI MDM: CPT

## 2024-01-03 PROCEDURE — 36415 COLL VENOUS BLD VENIPUNCTURE: CPT

## 2024-01-03 PROCEDURE — 3079F DIAST BP 80-89 MM HG: CPT

## 2024-01-03 PROCEDURE — 85025 COMPLETE CBC W/AUTO DIFF WBC: CPT

## 2024-01-03 PROCEDURE — 93000 ELECTROCARDIOGRAM COMPLETE: CPT | Performed by: FAMILY MEDICINE

## 2024-01-03 PROCEDURE — 93971 EXTREMITY STUDY: CPT

## 2024-01-03 PROCEDURE — 74177 CT ABD & PELVIS W/CONTRAST: CPT

## 2024-01-03 PROCEDURE — G8484 FLU IMMUNIZE NO ADMIN: HCPCS

## 2024-01-03 PROCEDURE — 4004F PT TOBACCO SCREEN RCVD TLK: CPT

## 2024-01-03 PROCEDURE — 99215 OFFICE O/P EST HI 40 MIN: CPT

## 2024-01-03 PROCEDURE — 80053 COMPREHEN METABOLIC PANEL: CPT

## 2024-01-03 PROCEDURE — 96374 THER/PROPH/DIAG INJ IV PUSH: CPT

## 2024-01-03 PROCEDURE — 6360000004 HC RX CONTRAST MEDICATION: Performed by: RADIOLOGY

## 2024-01-03 PROCEDURE — G8427 DOCREV CUR MEDS BY ELIG CLIN: HCPCS

## 2024-01-03 PROCEDURE — 3074F SYST BP LT 130 MM HG: CPT

## 2024-01-03 PROCEDURE — 93005 ELECTROCARDIOGRAM TRACING: CPT | Performed by: EMERGENCY MEDICINE

## 2024-01-03 PROCEDURE — G8419 CALC BMI OUT NRM PARAM NOF/U: HCPCS

## 2024-01-03 PROCEDURE — 83605 ASSAY OF LACTIC ACID: CPT

## 2024-01-03 PROCEDURE — 6360000002 HC RX W HCPCS

## 2024-01-03 PROCEDURE — 2580000003 HC RX 258

## 2024-01-03 RX ORDER — 0.9 % SODIUM CHLORIDE 0.9 %
1000 INTRAVENOUS SOLUTION INTRAVENOUS ONCE
Status: COMPLETED | OUTPATIENT
Start: 2024-01-03 | End: 2024-01-04

## 2024-01-03 RX ORDER — OXYCODONE HYDROCHLORIDE AND ACETAMINOPHEN 5; 325 MG/1; MG/1
1 TABLET ORAL ONCE
Status: COMPLETED | OUTPATIENT
Start: 2024-01-03 | End: 2024-01-03

## 2024-01-03 RX ORDER — PANTOPRAZOLE SODIUM 40 MG/1
40 TABLET, DELAYED RELEASE ORAL
Qty: 60 TABLET | Refills: 0 | Status: SHIPPED
Start: 2024-01-03 | End: 2024-01-03

## 2024-01-03 RX ORDER — KETOROLAC TROMETHAMINE 30 MG/ML
15 INJECTION, SOLUTION INTRAMUSCULAR; INTRAVENOUS ONCE
Status: COMPLETED | OUTPATIENT
Start: 2024-01-03 | End: 2024-01-03

## 2024-01-03 RX ORDER — METOPROLOL SUCCINATE 25 MG/1
25 TABLET, EXTENDED RELEASE ORAL DAILY
Qty: 30 TABLET | Refills: 0 | Status: SHIPPED
Start: 2024-01-03 | End: 2024-01-03

## 2024-01-03 RX ORDER — METHOCARBAMOL 750 MG/1
750 TABLET, FILM COATED ORAL 4 TIMES DAILY
Qty: 40 TABLET | Refills: 0 | Status: SHIPPED
Start: 2024-01-03 | End: 2024-01-03

## 2024-01-03 RX ADMIN — OXYCODONE AND ACETAMINOPHEN 1 TABLET: 5; 325 TABLET ORAL at 20:50

## 2024-01-03 RX ADMIN — KETOROLAC TROMETHAMINE 15 MG: 30 INJECTION, SOLUTION INTRAMUSCULAR; INTRAVENOUS at 20:50

## 2024-01-03 RX ADMIN — SODIUM CHLORIDE 1000 ML: 0.9 INJECTION, SOLUTION INTRAVENOUS at 20:50

## 2024-01-03 RX ADMIN — IOPAMIDOL 75 ML: 755 INJECTION, SOLUTION INTRAVENOUS at 23:07

## 2024-01-03 SDOH — ECONOMIC STABILITY: FOOD INSECURITY: WITHIN THE PAST 12 MONTHS, THE FOOD YOU BOUGHT JUST DIDN'T LAST AND YOU DIDN'T HAVE MONEY TO GET MORE.: SOMETIMES TRUE

## 2024-01-03 SDOH — ECONOMIC STABILITY: INCOME INSECURITY: HOW HARD IS IT FOR YOU TO PAY FOR THE VERY BASICS LIKE FOOD, HOUSING, MEDICAL CARE, AND HEATING?: NOT VERY HARD

## 2024-01-03 SDOH — ECONOMIC STABILITY: HOUSING INSECURITY
IN THE LAST 12 MONTHS, WAS THERE A TIME WHEN YOU DID NOT HAVE A STEADY PLACE TO SLEEP OR SLEPT IN A SHELTER (INCLUDING NOW)?: NO

## 2024-01-03 SDOH — ECONOMIC STABILITY: FOOD INSECURITY: WITHIN THE PAST 12 MONTHS, YOU WORRIED THAT YOUR FOOD WOULD RUN OUT BEFORE YOU GOT MONEY TO BUY MORE.: SOMETIMES TRUE

## 2024-01-03 ASSESSMENT — ENCOUNTER SYMPTOMS
RECTAL PAIN: 0
SHORTNESS OF BREATH: 0
NAUSEA: 0
STRIDOR: 0
CHEST TIGHTNESS: 0
ABDOMINAL DISTENTION: 0
ABDOMINAL PAIN: 0
VOMITING: 0
WHEEZING: 0

## 2024-01-03 ASSESSMENT — PATIENT HEALTH QUESTIONNAIRE - PHQ9
SUM OF ALL RESPONSES TO PHQ QUESTIONS 1-9: 0
1. LITTLE INTEREST OR PLEASURE IN DOING THINGS: 0
SUM OF ALL RESPONSES TO PHQ QUESTIONS 1-9: 0
2. FEELING DOWN, DEPRESSED OR HOPELESS: 0
SUM OF ALL RESPONSES TO PHQ QUESTIONS 1-9: 0
SUM OF ALL RESPONSES TO PHQ9 QUESTIONS 1 & 2: 0
SUM OF ALL RESPONSES TO PHQ QUESTIONS 1-9: 0

## 2024-01-03 ASSESSMENT — LIFESTYLE VARIABLES
HOW MANY STANDARD DRINKS CONTAINING ALCOHOL DO YOU HAVE ON A TYPICAL DAY: 1 OR 2
HOW OFTEN DO YOU HAVE A DRINK CONTAINING ALCOHOL: 4 OR MORE TIMES A WEEK

## 2024-01-03 NOTE — PATIENT INSTRUCTIONS
FOOD ASSISTANCE   [facility]  Text the words “HELP NETWORK” to : 315178   At the prompt bebeto “1” for information about food pantries or “2” to learn about free meal sites   Website:https://www.helpnetworkneo.org/  [facility] and [facility] [phone]  [facility] [phone]  [facility] [place], [facility] [employer] [phone]  [place] [phone]    [facility]   [address] [place] [address]  Hours: Mon-Sat 6am-1:30PM Sun Closed  Phone: [phone]    [facility]  [address] [place] [address] [phone]  Two full meals are served daily to the public at our Emergency Shelter located at [address] [place] [address] in [place].  Breakfast: 6:15 a.m.-7:15 a.m.  Dinner: 6:00 p.m.-7:00 p.m. All are welcome. No proof of income or ID is required.      [facility]  Meals on Wheels of [facility]   [address] [place] [address]   Phone: [phone]   Hot meal and sack lunch (for evening meal) Monday-Friday to residents of [facility]. Cost for weekly meals, discounted for two people.         [facility]   [address] [place] [address]   Phone: [phone]   Meal delivery options for residents of [facility], [facility], [place], [facility], [facility]. Delivered Monday-Friday. Frozen meals for Saturday and Sunday delivered on Friday. Cost for regular and special diet meals.     [employer] [facility]  [address] [address]   Phone: [phone]   Delivers hot meals to homebound individuals living in the [place] town[place] and [facility]. Monday-Friday. If eligible and funds available, two frozen meals for Saturday and Sunday.     [facility]   [address] [place] [address]   Phone:

## 2024-01-03 NOTE — PROGRESS NOTES
S: 38 y.o. male here for palps. Intermittent.   No syncope, cp, sob. Smoker.   Hematemesis on Sunday. EtOH abuse. ? Coffee grounds, inadequate history. Denies blood per rectum.   LLE pain. Severe. Worse with any movement. Seen in ED for this on 12/23/23. Cyst noted L sacral ala on CT.     O: VS: /84   Pulse (!) 128 Comment: radial and regular  Temp 98.7 °F (37.1 °C) (Temporal)   Resp 18   Ht 1.778 m (5' 10\")   Wt 95.3 kg (210 lb)   SpO2 98%   BMI 30.13 kg/m²    General: NAD, alert and interacting appropriately.    CV:  tachy, no gallops, rubs, or murmurs    Ext:  No edema. Good pulses. No pallor. Pt on crutches, needing to lift LLE with arm.      Impression: LLE pain. Sinus tach, h/o SVT, EtOH abuse. ? Hematemesis.   Plan:   D/w ortho and pain mngt.   I personally d/w pt the recommendations of specialists.   Send to ED for further eval of debilitating LLE pain, possible hematemesis, and tachycardia.       Attending Physician Statement  I have discussed the case, including pertinent history and exam findings with the resident and the specialists. I have personally seen the patient and performed pertinent parts of the physical exam.  I have I agree with the documented assessment and plan.
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
## 2024-01-04 ENCOUNTER — APPOINTMENT (OUTPATIENT)
Dept: CT IMAGING | Age: 39
DRG: 720 | End: 2024-01-04
Payer: COMMERCIAL

## 2024-01-04 PROBLEM — L02.91 ABSCESS: Status: ACTIVE | Noted: 2024-01-04

## 2024-01-04 LAB
AMPHET UR QL SCN: NEGATIVE
BARBITURATES UR QL SCN: NEGATIVE
BENZODIAZ UR QL: NEGATIVE
BILIRUB UR QL STRIP: NEGATIVE
BUPRENORPHINE UR QL: NEGATIVE
CANNABINOIDS UR QL SCN: NEGATIVE
CLARITY UR: CLEAR
COCAINE UR QL SCN: NEGATIVE
COLOR UR: YELLOW
CRYSTALS URNS MICRO: ABNORMAL /HPF
FENTANYL UR QL: NEGATIVE
FERRITIN SERPL-MCNC: 997 NG/ML
GLUCOSE UR STRIP-MCNC: NEGATIVE MG/DL
HAV IGM SERPL QL IA: NONREACTIVE
HBV CORE IGM SERPL QL IA: NONREACTIVE
HBV SURFACE AG SERPL QL IA: NONREACTIVE
HCV AB SERPL QL IA: NONREACTIVE
HGB UR QL STRIP.AUTO: NEGATIVE
HIV 1+2 AB+HIV1 P24 AG SERPL QL IA: NONREACTIVE
IMM RETICS NFR: 19.5 % (ref 2.3–13.4)
INR PPP: 1.5
IRON SATN MFR SERPL: 11 % (ref 20–55)
IRON SERPL-MCNC: 21 UG/DL (ref 59–158)
KETONES UR STRIP-MCNC: ABNORMAL MG/DL
LACTATE BLDV-SCNC: 0.7 MMOL/L (ref 0.5–2.2)
LEUKOCYTE ESTERASE UR QL STRIP: NEGATIVE
METHADONE UR QL: NEGATIVE
NITRITE UR QL STRIP: NEGATIVE
OPIATES UR QL SCN: NEGATIVE
OXYCODONE UR QL SCN: POSITIVE
PCP UR QL SCN: NEGATIVE
PH UR STRIP: 5.5 [PH] (ref 5–9)
PROT UR STRIP-MCNC: NEGATIVE MG/DL
PROTHROMBIN TIME: 16.2 SEC (ref 9.3–12.4)
RBC #/AREA URNS HPF: ABNORMAL /HPF
RETIC HEMOGLOBIN: 24.3 PG (ref 28.2–36.6)
RETICS # AUTO: 0.03 M/UL
RETICS/RBC NFR AUTO: 1 % (ref 0.4–1.9)
SP GR UR STRIP: 1.02 (ref 1–1.03)
TEST INFORMATION: ABNORMAL
TIBC SERPL-MCNC: 183 UG/DL (ref 250–450)
UROBILINOGEN UR STRIP-ACNC: 0.2 EU/DL (ref 0–1)
WBC #/AREA URNS HPF: ABNORMAL /HPF

## 2024-01-04 PROCEDURE — 80074 ACUTE HEPATITIS PANEL: CPT

## 2024-01-04 PROCEDURE — 83540 ASSAY OF IRON: CPT

## 2024-01-04 PROCEDURE — C1769 GUIDE WIRE: HCPCS

## 2024-01-04 PROCEDURE — 36415 COLL VENOUS BLD VENIPUNCTURE: CPT

## 2024-01-04 PROCEDURE — 99254 IP/OBS CNSLTJ NEW/EST MOD 60: CPT | Performed by: STUDENT IN AN ORGANIZED HEALTH CARE EDUCATION/TRAINING PROGRAM

## 2024-01-04 PROCEDURE — 85610 PROTHROMBIN TIME: CPT

## 2024-01-04 PROCEDURE — 83550 IRON BINDING TEST: CPT

## 2024-01-04 PROCEDURE — 2060000000 HC ICU INTERMEDIATE R&B

## 2024-01-04 PROCEDURE — 2580000003 HC RX 258

## 2024-01-04 PROCEDURE — 2500000003 HC RX 250 WO HCPCS: Performed by: RADIOLOGY

## 2024-01-04 PROCEDURE — 99222 1ST HOSP IP/OBS MODERATE 55: CPT | Performed by: FAMILY MEDICINE

## 2024-01-04 PROCEDURE — 83605 ASSAY OF LACTIC ACID: CPT

## 2024-01-04 PROCEDURE — 87389 HIV-1 AG W/HIV-1&-2 AB AG IA: CPT

## 2024-01-04 PROCEDURE — 6370000000 HC RX 637 (ALT 250 FOR IP)

## 2024-01-04 PROCEDURE — 96375 TX/PRO/DX INJ NEW DRUG ADDON: CPT

## 2024-01-04 PROCEDURE — 82728 ASSAY OF FERRITIN: CPT

## 2024-01-04 PROCEDURE — 75989 ABSCESS DRAINAGE UNDER X-RAY: CPT

## 2024-01-04 PROCEDURE — 6360000002 HC RX W HCPCS

## 2024-01-04 PROCEDURE — 80307 DRUG TEST PRSMV CHEM ANLYZR: CPT

## 2024-01-04 PROCEDURE — 87086 URINE CULTURE/COLONY COUNT: CPT

## 2024-01-04 PROCEDURE — 87040 BLOOD CULTURE FOR BACTERIA: CPT

## 2024-01-04 PROCEDURE — 87077 CULTURE AEROBIC IDENTIFY: CPT

## 2024-01-04 PROCEDURE — 0K9R30Z DRAINAGE OF LEFT UPPER LEG MUSCLE WITH DRAINAGE DEVICE, PERCUTANEOUS APPROACH: ICD-10-PCS | Performed by: RADIOLOGY

## 2024-01-04 PROCEDURE — 87070 CULTURE OTHR SPECIMN AEROBIC: CPT

## 2024-01-04 PROCEDURE — 85045 AUTOMATED RETICULOCYTE COUNT: CPT

## 2024-01-04 PROCEDURE — 6360000002 HC RX W HCPCS: Performed by: RADIOLOGY

## 2024-01-04 PROCEDURE — 87205 SMEAR GRAM STAIN: CPT

## 2024-01-04 PROCEDURE — 81001 URINALYSIS AUTO W/SCOPE: CPT

## 2024-01-04 RX ORDER — ONDANSETRON 2 MG/ML
4 INJECTION INTRAMUSCULAR; INTRAVENOUS EVERY 8 HOURS PRN
Status: DISCONTINUED | OUTPATIENT
Start: 2024-01-04 | End: 2024-01-04

## 2024-01-04 RX ORDER — SODIUM CHLORIDE, SODIUM LACTATE, POTASSIUM CHLORIDE, CALCIUM CHLORIDE 600; 310; 30; 20 MG/100ML; MG/100ML; MG/100ML; MG/100ML
INJECTION, SOLUTION INTRAVENOUS CONTINUOUS
Status: DISCONTINUED | OUTPATIENT
Start: 2024-01-04 | End: 2024-01-04

## 2024-01-04 RX ORDER — ACETAMINOPHEN 325 MG/1
650 TABLET ORAL EVERY 6 HOURS PRN
Status: DISCONTINUED | OUTPATIENT
Start: 2024-01-04 | End: 2024-01-07

## 2024-01-04 RX ORDER — POTASSIUM CHLORIDE 20 MEQ/1
40 TABLET, EXTENDED RELEASE ORAL PRN
Status: DISCONTINUED | OUTPATIENT
Start: 2024-01-04 | End: 2024-01-09 | Stop reason: HOSPADM

## 2024-01-04 RX ORDER — SODIUM CHLORIDE 9 MG/ML
INJECTION, SOLUTION INTRAVENOUS PRN
Status: DISCONTINUED | OUTPATIENT
Start: 2024-01-04 | End: 2024-01-09 | Stop reason: HOSPADM

## 2024-01-04 RX ORDER — METOPROLOL SUCCINATE 25 MG/1
25 TABLET, EXTENDED RELEASE ORAL DAILY
Status: ON HOLD | COMMUNITY
End: 2024-01-09 | Stop reason: HOSPADM

## 2024-01-04 RX ORDER — POTASSIUM CHLORIDE 7.45 MG/ML
10 INJECTION INTRAVENOUS PRN
Status: DISCONTINUED | OUTPATIENT
Start: 2024-01-04 | End: 2024-01-09 | Stop reason: HOSPADM

## 2024-01-04 RX ORDER — MIDAZOLAM HYDROCHLORIDE 2 MG/2ML
INJECTION, SOLUTION INTRAMUSCULAR; INTRAVENOUS PRN
Status: COMPLETED | OUTPATIENT
Start: 2024-01-04 | End: 2024-01-04

## 2024-01-04 RX ORDER — MORPHINE SULFATE 4 MG/ML
4 INJECTION, SOLUTION INTRAMUSCULAR; INTRAVENOUS
Status: DISPENSED | OUTPATIENT
Start: 2024-01-04 | End: 2024-01-07

## 2024-01-04 RX ORDER — PANTOPRAZOLE SODIUM 40 MG/1
40 TABLET, DELAYED RELEASE ORAL
Status: DISCONTINUED | OUTPATIENT
Start: 2024-01-04 | End: 2024-01-05

## 2024-01-04 RX ORDER — 0.9 % SODIUM CHLORIDE 0.9 %
1000 INTRAVENOUS SOLUTION INTRAVENOUS ONCE
Status: COMPLETED | OUTPATIENT
Start: 2024-01-04 | End: 2024-01-04

## 2024-01-04 RX ORDER — SODIUM CHLORIDE 0.9 % (FLUSH) 0.9 %
5-40 SYRINGE (ML) INJECTION PRN
Status: DISCONTINUED | OUTPATIENT
Start: 2024-01-04 | End: 2024-01-09 | Stop reason: HOSPADM

## 2024-01-04 RX ORDER — SODIUM CHLORIDE 9 MG/ML
INJECTION, SOLUTION INTRAVENOUS CONTINUOUS
Status: DISCONTINUED | OUTPATIENT
Start: 2024-01-04 | End: 2024-01-04

## 2024-01-04 RX ORDER — ONDANSETRON 2 MG/ML
4 INJECTION INTRAMUSCULAR; INTRAVENOUS EVERY 6 HOURS PRN
Status: DISCONTINUED | OUTPATIENT
Start: 2024-01-04 | End: 2024-01-09 | Stop reason: HOSPADM

## 2024-01-04 RX ORDER — SODIUM CHLORIDE 0.9 % (FLUSH) 0.9 %
5-40 SYRINGE (ML) INJECTION EVERY 12 HOURS SCHEDULED
Status: DISCONTINUED | OUTPATIENT
Start: 2024-01-04 | End: 2024-01-09 | Stop reason: HOSPADM

## 2024-01-04 RX ORDER — MAGNESIUM SULFATE IN WATER 40 MG/ML
2000 INJECTION, SOLUTION INTRAVENOUS PRN
Status: DISCONTINUED | OUTPATIENT
Start: 2024-01-04 | End: 2024-01-09 | Stop reason: HOSPADM

## 2024-01-04 RX ORDER — LIDOCAINE HYDROCHLORIDE 20 MG/ML
INJECTION, SOLUTION INFILTRATION; PERINEURAL PRN
Status: COMPLETED | OUTPATIENT
Start: 2024-01-04 | End: 2024-01-04

## 2024-01-04 RX ORDER — 0.9 % SODIUM CHLORIDE 0.9 %
1000 INTRAVENOUS SOLUTION INTRAVENOUS ONCE
Status: DISCONTINUED | OUTPATIENT
Start: 2024-01-04 | End: 2024-01-04

## 2024-01-04 RX ORDER — ACETAMINOPHEN 650 MG/1
650 SUPPOSITORY RECTAL EVERY 6 HOURS PRN
Status: DISCONTINUED | OUTPATIENT
Start: 2024-01-04 | End: 2024-01-07

## 2024-01-04 RX ORDER — POLYETHYLENE GLYCOL 3350 17 G/17G
17 POWDER, FOR SOLUTION ORAL DAILY
Status: DISCONTINUED | OUTPATIENT
Start: 2024-01-04 | End: 2024-01-09 | Stop reason: HOSPADM

## 2024-01-04 RX ORDER — LIDOCAINE HYDROCHLORIDE AND EPINEPHRINE BITARTRATE 20; .01 MG/ML; MG/ML
INJECTION, SOLUTION SUBCUTANEOUS PRN
Status: COMPLETED | OUTPATIENT
Start: 2024-01-04 | End: 2024-01-04

## 2024-01-04 RX ORDER — FENTANYL CITRATE 50 UG/ML
INJECTION, SOLUTION INTRAMUSCULAR; INTRAVENOUS PRN
Status: COMPLETED | OUTPATIENT
Start: 2024-01-04 | End: 2024-01-04

## 2024-01-04 RX ORDER — PANTOPRAZOLE SODIUM 40 MG/1
40 TABLET, DELAYED RELEASE ORAL DAILY
COMMUNITY
End: 2024-01-12

## 2024-01-04 RX ORDER — NAPROXEN 500 MG/1
1000 TABLET ORAL 2 TIMES DAILY PRN
Status: ON HOLD | COMMUNITY
End: 2024-01-09 | Stop reason: HOSPADM

## 2024-01-04 RX ORDER — ONDANSETRON 4 MG/1
4 TABLET, ORALLY DISINTEGRATING ORAL EVERY 8 HOURS PRN
Status: DISCONTINUED | OUTPATIENT
Start: 2024-01-04 | End: 2024-01-09 | Stop reason: HOSPADM

## 2024-01-04 RX ORDER — DIPHENHYDRAMINE HYDROCHLORIDE 50 MG/ML
INJECTION INTRAMUSCULAR; INTRAVENOUS PRN
Status: COMPLETED | OUTPATIENT
Start: 2024-01-04 | End: 2024-01-04

## 2024-01-04 RX ORDER — METOPROLOL SUCCINATE 25 MG/1
25 TABLET, EXTENDED RELEASE ORAL DAILY
Status: CANCELLED | OUTPATIENT
Start: 2024-01-04

## 2024-01-04 RX ORDER — NICOTINE 21 MG/24HR
1 PATCH, TRANSDERMAL 24 HOURS TRANSDERMAL DAILY
Status: DISCONTINUED | OUTPATIENT
Start: 2024-01-04 | End: 2024-01-09 | Stop reason: HOSPADM

## 2024-01-04 RX ADMIN — FENTANYL CITRATE 25 MCG: 50 INJECTION, SOLUTION INTRAMUSCULAR; INTRAVENOUS at 15:06

## 2024-01-04 RX ADMIN — PIPERACILLIN AND TAZOBACTAM 4500 MG: 4; .5 INJECTION, POWDER, FOR SOLUTION INTRAVENOUS at 08:41

## 2024-01-04 RX ADMIN — PIPERACILLIN AND TAZOBACTAM 4500 MG: 4; .5 INJECTION, POWDER, FOR SOLUTION INTRAVENOUS at 03:46

## 2024-01-04 RX ADMIN — SERTRALINE 50 MG: 25 TABLET, FILM COATED ORAL at 08:44

## 2024-01-04 RX ADMIN — PANTOPRAZOLE SODIUM 40 MG: 40 TABLET, DELAYED RELEASE ORAL at 08:44

## 2024-01-04 RX ADMIN — FENTANYL CITRATE 25 MCG: 50 INJECTION, SOLUTION INTRAMUSCULAR; INTRAVENOUS at 15:11

## 2024-01-04 RX ADMIN — VANCOMYCIN HYDROCHLORIDE 2000 MG: 10 INJECTION, POWDER, LYOPHILIZED, FOR SOLUTION INTRAVENOUS at 04:54

## 2024-01-04 RX ADMIN — MIDAZOLAM HYDROCHLORIDE 0.5 MG: 1 INJECTION, SOLUTION INTRAMUSCULAR; INTRAVENOUS at 15:10

## 2024-01-04 RX ADMIN — MORPHINE SULFATE 4 MG: 4 INJECTION, SOLUTION INTRAMUSCULAR; INTRAVENOUS at 16:05

## 2024-01-04 RX ADMIN — MIDAZOLAM HYDROCHLORIDE 0.5 MG: 1 INJECTION, SOLUTION INTRAMUSCULAR; INTRAVENOUS at 15:15

## 2024-01-04 RX ADMIN — MORPHINE SULFATE 4 MG: 4 INJECTION, SOLUTION INTRAMUSCULAR; INTRAVENOUS at 08:44

## 2024-01-04 RX ADMIN — MIDAZOLAM HYDROCHLORIDE 0.5 MG: 1 INJECTION, SOLUTION INTRAMUSCULAR; INTRAVENOUS at 15:00

## 2024-01-04 RX ADMIN — PIPERACILLIN AND TAZOBACTAM 4500 MG: 4; .5 INJECTION, POWDER, FOR SOLUTION INTRAVENOUS at 20:47

## 2024-01-04 RX ADMIN — MORPHINE SULFATE 4 MG: 4 INJECTION, SOLUTION INTRAMUSCULAR; INTRAVENOUS at 18:46

## 2024-01-04 RX ADMIN — PIPERACILLIN AND TAZOBACTAM 4500 MG: 4; .5 INJECTION, POWDER, FOR SOLUTION INTRAVENOUS at 16:00

## 2024-01-04 RX ADMIN — MIDAZOLAM HYDROCHLORIDE 0.5 MG: 1 INJECTION, SOLUTION INTRAMUSCULAR; INTRAVENOUS at 15:06

## 2024-01-04 RX ADMIN — FENTANYL CITRATE 25 MCG: 50 INJECTION, SOLUTION INTRAMUSCULAR; INTRAVENOUS at 15:16

## 2024-01-04 RX ADMIN — VANCOMYCIN HYDROCHLORIDE 1500 MG: 10 INJECTION, POWDER, LYOPHILIZED, FOR SOLUTION INTRAVENOUS at 18:44

## 2024-01-04 RX ADMIN — SODIUM CHLORIDE 1000 ML: 9 INJECTION, SOLUTION INTRAVENOUS at 03:53

## 2024-01-04 RX ADMIN — SODIUM CHLORIDE, POTASSIUM CHLORIDE, SODIUM LACTATE AND CALCIUM CHLORIDE: 600; 310; 30; 20 INJECTION, SOLUTION INTRAVENOUS at 06:54

## 2024-01-04 RX ADMIN — FENTANYL CITRATE 25 MCG: 50 INJECTION, SOLUTION INTRAMUSCULAR; INTRAVENOUS at 15:01

## 2024-01-04 RX ADMIN — MORPHINE SULFATE 4 MG: 4 INJECTION, SOLUTION INTRAMUSCULAR; INTRAVENOUS at 21:20

## 2024-01-04 RX ADMIN — LIDOCAINE HYDROCHLORIDE 10 ML: 20 INJECTION, SOLUTION INFILTRATION; PERINEURAL at 15:10

## 2024-01-04 RX ADMIN — DIPHENHYDRAMINE HYDROCHLORIDE 25 MG: 50 INJECTION, SOLUTION INTRAMUSCULAR; INTRAVENOUS at 15:02

## 2024-01-04 RX ADMIN — Medication 10 ML: at 08:44

## 2024-01-04 RX ADMIN — LIDOCAINE HYDROCHLORIDE,EPINEPHRINE BITARTRATE 10 ML: 20; .01 INJECTION, SOLUTION INFILTRATION; PERINEURAL at 15:11

## 2024-01-04 ASSESSMENT — ENCOUNTER SYMPTOMS
ABDOMINAL PAIN: 0
SHORTNESS OF BREATH: 0
CHEST TIGHTNESS: 0
DIARRHEA: 0
COUGH: 0
VOMITING: 0
NAUSEA: 0

## 2024-01-04 ASSESSMENT — PAIN SCALES - GENERAL
PAINLEVEL_OUTOF10: 9
PAINLEVEL_OUTOF10: 7
PAINLEVEL_OUTOF10: 8
PAINLEVEL_OUTOF10: 9

## 2024-01-04 ASSESSMENT — PAIN DESCRIPTION - LOCATION
LOCATION: LEG

## 2024-01-04 ASSESSMENT — PAIN DESCRIPTION - ORIENTATION
ORIENTATION: LEFT

## 2024-01-04 ASSESSMENT — PAIN DESCRIPTION - DESCRIPTORS
DESCRIPTORS: ACHING

## 2024-01-04 NOTE — ED NOTES
This RN went to check patient's vitals and chart them and noted patient had removed BP cuff and had it resting on the side of the bed. BP cuff placed back on and educated the patient on the importnace of keeping it on to assess vitals post procedure.

## 2024-01-04 NOTE — PRE SEDATION
Sedation Pre-Procedure Note    Patient Name: Harmeet Doyle   YOB: 1985  Room/Bed: 21/21  Medical Record Number: [id number]  Date: 1/4/2024   Time: 3:38 PM       Indication:  left pelvic abscess    Consent: I have discussed with the patient and/or the patient representative the indication, alternatives, and the possible risks and/or complications of the planned procedure and the anesthesia methods. The patient and/or patient representative appear to understand and agree to proceed.    Vital Signs:   Vitals:    01/04/24 1531   BP: 120/70   Pulse: (!) 112   Resp: 26   Temp:    SpO2: 95%       Past Medical History:   has no past medical history on file.    Past Surgical History:   has no past surgical history on file.    Medications:   Scheduled Meds:    piperacillin-tazobactam  4,500 mg IntraVENous Q6H    sertraline  50 mg Oral Daily    sodium chloride flush  5-40 mL IntraVENous 2 times per day    polyethylene glycol  17 g Oral Daily    nicotine  1 patch TransDERmal Daily    pantoprazole  40 mg Oral QAM AC     Continuous Infusions:    lactated ringers IV soln 125 mL/hr at 01/04/24 0654    sodium chloride       PRN Meds: morphine, sodium chloride flush, sodium chloride, potassium chloride **OR** potassium alternative oral replacement **OR** potassium chloride, magnesium sulfate, ondansetron **OR** ondansetron, acetaminophen **OR** acetaminophen  Home Meds:   Prior to Admission medications    Medication Sig Start Date End Date Taking? Authorizing Provider   metoprolol succinate (TOPROL XL) 25 MG extended release tablet Take 1 tablet by mouth daily   Yes ProviderAmy MD   pantoprazole (PROTONIX) 40 MG tablet Take 1 tablet by mouth daily   Yes ProviderAmy MD   aspirin-acetaminophen-caffeine (EXCEDRIN MIGRAINE) 250-250-65 MG per tablet Take 2 tablets by mouth every 12 hours as needed for Pain   Yes ProviderAmy MD   naproxen (NAPROSYN) 500 MG tablet Take 2 tablets by mouth 2 times

## 2024-01-04 NOTE — ED NOTES
Pt went to restroom and stated he had blood in stool. This RN was unable to assess because pt used private restroom and flushed before making this RN aware. Dr. Zhao notified

## 2024-01-04 NOTE — ED PROVIDER NOTES
are fluid collections in the left adductor musculature measuring up   to 7.6 x 4.4 x 5.8 cm.  These are concerning for abscess however hematoma is   an additional consideration.  Also, cannot exclude an underlying neoplasm.   2. Abnormal appearance of the inferior pelvis.  There is generalized   inflammatory stranding present.  The urinary bladder is poorly distended but   may be thickened.  There is suggestion of a 1.5 cm collection along the   superior margin of the pubic symphysis.  Not clear if this reflects extension   of inflammation from the adjacent adductor pathology or if it could reflect a   primary pelvic infectious process.   3. 1.6 cm area of hypoenhancement in the right kidney.  This region is poorly   evaluated due to artifact from fusion hardware, however considerations would   include pyelonephritis, less likely infarct or neoplasm.  Correlate   clinically.  Consider short-term follow-up renal imaging to exclude a lesion.         Vascular duplex lower extremity venous left   Final Result   No evidence of DVT in the left lower extremity.         IR INTERVENTIONAL RADIOLOGY PROCEDURE REQUEST    (Results Pending)       . ------------------------- NURSING NOTES AND VITALS REVIEWED ---------------------------  Date / Time Roomed:  1/3/2024  7:31 PM  ED Bed Assignment:  21/21    The nursing notes within the ED encounter and vital signs as below have been reviewed.     Patient Vitals for the past 24 hrs:   BP Temp Temp src Pulse Resp SpO2   01/03/24 2357 135/86 98.1 °F (36.7 °C) Temporal (!) 120 -- 100 %   01/03/24 1929 138/83 -- -- -- 20 --   01/03/24 1914 -- 97.3 °F (36.3 °C) Temporal (!) 146 -- 100 %       Oxygen Saturation Interpretation: Normal    ------------------------------------------ PROGRESS NOTES ------------------------------------------  Re-evaluation(s):  Time: 0300  Patient’s symptoms are improving  Repeat physical examination is improved    Counseling:  I have spoken with the patient and

## 2024-01-04 NOTE — H&P
[facility] - Family Medicine Inpatient   Resident History and Physical    HPI:  Harmeet Doyle is a 38 y.o. male with a PMH of Depression/Anxiety who presented with complaints of left leg swelling and tenderness that has been present for the past two weeks. He describes it as being a very tight sensation with a constant pain throughout the entire left leg with associated limited his mobility. He states, \"I also have a hard lump under my skin up on the thigh. It's been there for a minute and it's painful\". Nothing has alleviated the pain nor swelling. Patient does note that he had a recurring abscess that would drain blood/pus like discharge on his buttocks for the past few months. He denies having it evaluated and would clean with alcohol as needed. Of note, the patient was incarcerated for 13 years. During his incarceration he endorses having recurring abscesses mainly on his armpits and groin. Patient unsure if he has or his immediate family have any history of HS. Patient denies any other abnormal symptoms at this time. He denies any IV drug abuse, medication changes, trauma, long distance travel, or blood clot history. Patient smokes tobacco 1 pack per day and drinks about 2 cans of beers daily. Denies drug use. Patient to remain a full code.      ED Course  In the ED. The patient's vitals were significant for tachycardia (120-140s). All other vitals were within normal limits. No EKG performed. Labs with leukocytosis and anemia. Imaging with a fluid collection in the left adductor musculature (hematoma vs abscess). Patient treated with Toradol 15 mg x 1, Percocet 5-325 mg x 1, Zosyn x 1, Vancomycin x 1,  and 1000 cc NS Bolus x 2. He tolerated these well. Gen surg consulted and recommended drain placement with appropriate antibiotics. Patient in agreeance. No acute events occurred while in the ED.     Pertinent Labs   -WBC 21.2/HB 9.4/  -Albumin 3.2   -Pending Blood cultures, Urine culture, and HIV     Pertinent

## 2024-01-04 NOTE — ED NOTES
The following labs were labeled with appropriate pt sticker and tubed to lab:     [] Blue     [x] Lavender   [] on ice  [x] Green/yellow  [] Green/black [] on ice  [] Leyva  [] on ice  [x] Yellow  [] Red  [] Type/ Screen  [] ABG  [] VBG    [] COVID-19 swab    [] Rapid  [] PCR  [] Flu swab  [] Peds Viral Panel     [x] Urine Sample  [] Fecal Sample  [] Pelvic Cultures  [] Blood Cultures  [] X 2  [] STREP Cultures

## 2024-01-04 NOTE — BRIEF OP NOTE
Brief Postoperative Note      Patient: Harmeet Doyle  YOB: 1985  MRN: [id number]    Date of Procedure: 1/4/2024    CT guided abscess drainage and catheter placement    Post-Op Diagnosis: Same       CT guided abscess drainage and catheter placement  TOI Avila    Assistant:  * No surgical staff found *    Anesthesia: * Moderate  sedation*    Estimated Blood Loss (mL): Minimal    Complications: None    Specimens:   * No specimens in log *    Implants:  * No implants in log *      Drains: * No LDAs found *    Findings: over 80 ccs of neida pus was removed and a 10 Fr drain was placed      Electronically signed by SOFIE Avila MD on 1/4/2024 at 3:38 PM

## 2024-01-05 PROBLEM — R01.1 HEART MURMUR: Status: ACTIVE | Noted: 2024-01-05

## 2024-01-05 LAB
ABO + RH BLD: NORMAL
ALBUMIN SERPL-MCNC: 2.6 G/DL (ref 3.5–5.2)
ALP SERPL-CCNC: 67 U/L (ref 40–129)
ALT SERPL-CCNC: 27 U/L (ref 0–40)
ANION GAP SERPL CALCULATED.3IONS-SCNC: 13 MMOL/L (ref 7–16)
ANION GAP SERPL CALCULATED.3IONS-SCNC: 13 MMOL/L (ref 7–16)
ARM BAND NUMBER: NORMAL
ASO AB SERPL-ACNC: 75 IU/ML (ref 0–200)
AST SERPL-CCNC: 34 U/L (ref 0–39)
BASOPHILS # BLD: 0.08 K/UL (ref 0–0.2)
BASOPHILS NFR BLD: 0 % (ref 0–2)
BILIRUB SERPL-MCNC: 0.3 MG/DL (ref 0–1.2)
BLOOD BANK SAMPLE EXPIRATION: NORMAL
BLOOD GROUP ANTIBODIES SERPL: NEGATIVE
BUN SERPL-MCNC: 9 MG/DL (ref 6–20)
BUN SERPL-MCNC: 9 MG/DL (ref 6–20)
CALCIUM SERPL-MCNC: 8.8 MG/DL (ref 8.6–10.2)
CALCIUM SERPL-MCNC: 9.4 MG/DL (ref 8.6–10.2)
CHLORIDE SERPL-SCNC: 98 MMOL/L (ref 98–107)
CHLORIDE SERPL-SCNC: 99 MMOL/L (ref 98–107)
CLOT ANGLE.KAOLIN INDUCED BLD RES TEG: 79.7 DEG (ref 53–70)
CO2 SERPL-SCNC: 19 MMOL/L (ref 22–29)
CO2 SERPL-SCNC: 21 MMOL/L (ref 22–29)
CREAT SERPL-MCNC: 0.8 MG/DL (ref 0.7–1.2)
CREAT SERPL-MCNC: 0.9 MG/DL (ref 0.7–1.2)
EKG ATRIAL RATE: 143 BPM
EKG P AXIS: 61 DEGREES
EKG P-R INTERVAL: 128 MS
EKG Q-T INTERVAL: 270 MS
EKG QRS DURATION: 74 MS
EKG QTC CALCULATION (BAZETT): 416 MS
EKG R AXIS: 64 DEGREES
EKG T AXIS: 30 DEGREES
EKG VENTRICULAR RATE: 143 BPM
EOSINOPHIL # BLD: 0.05 K/UL (ref 0.05–0.5)
EOSINOPHILS RELATIVE PERCENT: 0 % (ref 0–6)
EPL-TEG: 0.8 % (ref 0–15)
ERYTHROCYTE [DISTWIDTH] IN BLOOD BY AUTOMATED COUNT: 14.8 % (ref 11.5–15)
G-TEG: 24.4 KDYN/CM2 (ref 4.5–11)
GFR SERPL CREATININE-BSD FRML MDRD: >60 ML/MIN/1.73M2
GFR SERPL CREATININE-BSD FRML MDRD: >60 ML/MIN/1.73M2
GLUCOSE SERPL-MCNC: 115 MG/DL (ref 74–99)
GLUCOSE SERPL-MCNC: 95 MG/DL (ref 74–99)
HAPTOGLOB SERPL-MCNC: 468 MG/DL (ref 30–200)
HCT VFR BLD AUTO: 21.3 % (ref 37–54)
HCT VFR BLD AUTO: 21.3 % (ref 37–54)
HCT VFR BLD AUTO: 21.9 % (ref 37–54)
HCT VFR BLD AUTO: 24.1 % (ref 37–54)
HGB BLD-MCNC: 7 G/DL (ref 12.5–16.5)
HGB BLD-MCNC: 7 G/DL (ref 12.5–16.5)
HGB BLD-MCNC: 7.3 G/DL (ref 12.5–16.5)
HGB BLD-MCNC: 8 G/DL (ref 12.5–16.5)
IMM GRANULOCYTES # BLD AUTO: 0.22 K/UL (ref 0–0.58)
IMM GRANULOCYTES NFR BLD: 1 % (ref 0–5)
INR PPP: 1.4
INR PPP: 1.4
KINETICS TEG: 0.8 MIN (ref 1–3)
LDH SERPL-CCNC: 229 U/L (ref 135–225)
LY30 (LYSIS) TEG: 0.8 % (ref 0–8)
LYMPHOCYTES NFR BLD: 1.8 K/UL (ref 1.5–4)
LYMPHOCYTES RELATIVE PERCENT: 9 % (ref 20–42)
MA (MAX CLOT) TEG: 83 MM (ref 50–70)
MCH RBC QN AUTO: 25.1 PG (ref 26–35)
MCHC RBC AUTO-ENTMCNC: 33.2 G/DL (ref 32–34.5)
MCV RBC AUTO: 75.5 FL (ref 80–99.9)
MICROORGANISM SPEC CULT: ABNORMAL
MONOCYTES NFR BLD: 1.23 K/UL (ref 0.1–0.95)
MONOCYTES NFR BLD: 6 % (ref 2–12)
NEUTROPHILS NFR BLD: 84 % (ref 43–80)
NEUTS SEG NFR BLD: 17.38 K/UL (ref 1.8–7.3)
PATH REV BLD -IMP: NORMAL
PLATELET CONFIRMATION: NORMAL
PLATELET, FLUORESCENCE: 567 K/UL (ref 130–450)
PMV BLD AUTO: 9.4 FL (ref 7–12)
POTASSIUM SERPL-SCNC: 4.2 MMOL/L (ref 3.5–5)
POTASSIUM SERPL-SCNC: 5 MMOL/L (ref 3.5–5)
PROT SERPL-MCNC: 6.4 G/DL (ref 6.4–8.3)
PROTHROMBIN TIME: 15.3 SEC (ref 9.3–12.4)
PROTHROMBIN TIME: 15.3 SEC (ref 9.3–12.4)
RBC # BLD AUTO: 3.19 M/UL (ref 3.8–5.8)
REACTION TIME TEG: 5.1 MIN (ref 5–10)
SODIUM SERPL-SCNC: 131 MMOL/L (ref 132–146)
SODIUM SERPL-SCNC: 132 MMOL/L (ref 132–146)
SPECIMEN DESCRIPTION: ABNORMAL
TSH SERPL DL<=0.05 MIU/L-ACNC: 2.85 UIU/ML (ref 0.27–4.2)
WBC OTHER # BLD: 20.8 K/UL (ref 4.5–11.5)

## 2024-01-05 PROCEDURE — 85014 HEMATOCRIT: CPT

## 2024-01-05 PROCEDURE — 6360000002 HC RX W HCPCS

## 2024-01-05 PROCEDURE — 2580000003 HC RX 258

## 2024-01-05 PROCEDURE — 84443 ASSAY THYROID STIM HORMONE: CPT

## 2024-01-05 PROCEDURE — 83010 ASSAY OF HAPTOGLOBIN QUANT: CPT

## 2024-01-05 PROCEDURE — 2060000000 HC ICU INTERMEDIATE R&B

## 2024-01-05 PROCEDURE — 6370000000 HC RX 637 (ALT 250 FOR IP)

## 2024-01-05 PROCEDURE — 93010 ELECTROCARDIOGRAM REPORT: CPT | Performed by: INTERNAL MEDICINE

## 2024-01-05 PROCEDURE — 85576 BLOOD PLATELET AGGREGATION: CPT

## 2024-01-05 PROCEDURE — 85610 PROTHROMBIN TIME: CPT

## 2024-01-05 PROCEDURE — 85025 COMPLETE CBC W/AUTO DIFF WBC: CPT

## 2024-01-05 PROCEDURE — 36415 COLL VENOUS BLD VENIPUNCTURE: CPT

## 2024-01-05 PROCEDURE — 80048 BASIC METABOLIC PNL TOTAL CA: CPT

## 2024-01-05 PROCEDURE — 6370000000 HC RX 637 (ALT 250 FOR IP): Performed by: INTERNAL MEDICINE

## 2024-01-05 PROCEDURE — 99232 SBSQ HOSP IP/OBS MODERATE 35: CPT | Performed by: FAMILY MEDICINE

## 2024-01-05 PROCEDURE — 99231 SBSQ HOSP IP/OBS SF/LOW 25: CPT | Performed by: SURGERY

## 2024-01-05 PROCEDURE — 80053 COMPREHEN METABOLIC PANEL: CPT

## 2024-01-05 PROCEDURE — 85390 FIBRINOLYSINS SCREEN I&R: CPT

## 2024-01-05 PROCEDURE — 86850 RBC ANTIBODY SCREEN: CPT

## 2024-01-05 PROCEDURE — 85347 COAGULATION TIME ACTIVATED: CPT

## 2024-01-05 PROCEDURE — 86063 ANTISTREPTOLYSIN O SCREEN: CPT

## 2024-01-05 PROCEDURE — 86901 BLOOD TYPING SEROLOGIC RH(D): CPT

## 2024-01-05 PROCEDURE — 83615 LACTATE (LD) (LDH) ENZYME: CPT

## 2024-01-05 PROCEDURE — 86900 BLOOD TYPING SEROLOGIC ABO: CPT

## 2024-01-05 PROCEDURE — 85018 HEMOGLOBIN: CPT

## 2024-01-05 PROCEDURE — 85384 FIBRINOGEN ACTIVITY: CPT

## 2024-01-05 RX ORDER — PANTOPRAZOLE SODIUM 40 MG/1
40 TABLET, DELAYED RELEASE ORAL
Status: DISCONTINUED | OUTPATIENT
Start: 2024-01-05 | End: 2024-01-09 | Stop reason: HOSPADM

## 2024-01-05 RX ORDER — AMOXICILLIN AND CLAVULANATE POTASSIUM 562.5; 437.5; 62.5 MG/1; MG/1; MG/1
2 TABLET, MULTILAYER, EXTENDED RELEASE ORAL 2 TIMES DAILY
Status: DISCONTINUED | OUTPATIENT
Start: 2024-01-05 | End: 2024-01-05

## 2024-01-05 RX ORDER — LINEZOLID 600 MG/1
600 TABLET, FILM COATED ORAL 2 TIMES DAILY
Qty: 28 TABLET | Refills: 0 | Status: SHIPPED | OUTPATIENT
Start: 2024-01-05 | End: 2024-01-08

## 2024-01-05 RX ORDER — LINEZOLID 600 MG/1
600 TABLET, FILM COATED ORAL EVERY 12 HOURS SCHEDULED
Status: DISCONTINUED | OUTPATIENT
Start: 2024-01-05 | End: 2024-01-05

## 2024-01-05 RX ORDER — LINEZOLID 600 MG/1
600 TABLET, FILM COATED ORAL EVERY 12 HOURS SCHEDULED
Status: DISCONTINUED | OUTPATIENT
Start: 2024-01-05 | End: 2024-01-09 | Stop reason: HOSPADM

## 2024-01-05 RX ADMIN — SERTRALINE 50 MG: 25 TABLET, FILM COATED ORAL at 08:18

## 2024-01-05 RX ADMIN — PANTOPRAZOLE SODIUM 40 MG: 40 TABLET, DELAYED RELEASE ORAL at 08:18

## 2024-01-05 RX ADMIN — PIPERACILLIN AND TAZOBACTAM 4500 MG: 4; .5 INJECTION, POWDER, FOR SOLUTION INTRAVENOUS at 04:31

## 2024-01-05 RX ADMIN — ACETAMINOPHEN 650 MG: 325 TABLET ORAL at 20:28

## 2024-01-05 RX ADMIN — MORPHINE SULFATE 4 MG: 4 INJECTION, SOLUTION INTRAMUSCULAR; INTRAVENOUS at 04:29

## 2024-01-05 RX ADMIN — LINEZOLID 600 MG: 600 TABLET, FILM COATED ORAL at 16:01

## 2024-01-05 RX ADMIN — MORPHINE SULFATE 4 MG: 4 INJECTION, SOLUTION INTRAMUSCULAR; INTRAVENOUS at 14:22

## 2024-01-05 RX ADMIN — MORPHINE SULFATE 4 MG: 4 INJECTION, SOLUTION INTRAMUSCULAR; INTRAVENOUS at 09:20

## 2024-01-05 RX ADMIN — PANTOPRAZOLE SODIUM 40 MG: 40 TABLET, DELAYED RELEASE ORAL at 14:24

## 2024-01-05 RX ADMIN — PIPERACILLIN AND TAZOBACTAM 4500 MG: 4; .5 INJECTION, POWDER, FOR SOLUTION INTRAVENOUS at 08:22

## 2024-01-05 RX ADMIN — VANCOMYCIN HYDROCHLORIDE 1500 MG: 10 INJECTION, POWDER, LYOPHILIZED, FOR SOLUTION INTRAVENOUS at 07:06

## 2024-01-05 RX ADMIN — Medication 10 ML: at 20:29

## 2024-01-05 RX ADMIN — POLYETHYLENE GLYCOL 3350 17 G: 17 POWDER, FOR SOLUTION ORAL at 08:18

## 2024-01-05 ASSESSMENT — PAIN DESCRIPTION - ORIENTATION
ORIENTATION: LEFT
ORIENTATION: LEFT

## 2024-01-05 ASSESSMENT — PAIN SCALES - GENERAL
PAINLEVEL_OUTOF10: 10
PAINLEVEL_OUTOF10: 10
PAINLEVEL_OUTOF10: 6
PAINLEVEL_OUTOF10: 8

## 2024-01-05 ASSESSMENT — PAIN DESCRIPTION - DESCRIPTORS
DESCRIPTORS: ACHING;POUNDING
DESCRIPTORS: ACHING

## 2024-01-05 ASSESSMENT — PAIN DESCRIPTION - LOCATION
LOCATION: GROIN
LOCATION: LEG

## 2024-01-05 NOTE — CONSULTS
GENERAL SURGERY  CONSULT NOTE  1/4/2024    Physician Consulted: Dr. Zheng  Reason for Consult: left groin abscess  Referring Physician: Dr. Renata MATTHEWS  Harmeet Doyle is a 38 y.o. male who presents for evaluation of left groin pain and swelling. Pt states he has had this pain for the past week and a half. He states he was at work when the pain began and denies any knowledge of trauma to the area. He states he has noticed his left leg appearing larger compared to his right leg and has been tender to the touch. He denies any issues voiding or having bowel movements. No abdominal pain. Pt does not have personal history of diabetes but sates his mom does. He states he was last tested for HIV 1 year ago and does not believe there is any reason he may have contracted it. Past surgical history of right hernia repair when he was a kid and spinal surgery. Denies any daily medications including anticoagulation. Pt does smoke around 1 ppd and states he has cut down on his alcohol use and is down to 2-3 beers a day.    AF, tachycardic, normotensive  On exam pt is resting in bed. His left groin is tender to palpation as well as the upper, medial aspect of his left thigh. There is no obvious erythema  Labs WBC 21.2, Hb 9.4, BUN 21, Cr 0.7, Lactic acid 2.0  CT shows left inguinal lymphadenopathy and intramuscular fluid collection in left upper thigh      No past medical history on file.    No past surgical history on file.    Medications Prior to Admission:    Prior to Admission medications    Medication Sig Start Date End Date Taking? Authorizing Provider   sertraline (ZOLOFT) 50 MG tablet Take 1 tablet by mouth daily 1/3/24   Susannah Adkins, DO       No Known Allergies    Family History   Problem Relation Age of Onset    Diabetes Mother     High Blood Pressure Mother     Diabetes Father     Cancer Sister     Cancer Maternal Grandfather        Social History     Tobacco Use    Smoking status: Every Day     Current packs/day: 
morphine sulfate (PF) injection 4 mg  4 mg IntraVENous Q2H PRN Alejandro Ruiz MD   4 mg at 01/05/24 0920    sertraline (ZOLOFT) tablet 50 mg  50 mg Oral Daily Alejandro Ruiz MD   50 mg at 01/05/24 0818    sodium chloride flush 0.9 % injection 5-40 mL  5-40 mL IntraVENous 2 times per day Alejandro Ruiz MD   10 mL at 01/04/24 0844    sodium chloride flush 0.9 % injection 5-40 mL  5-40 mL IntraVENous PRN Alejandro Ruiz MD        0.9 % sodium chloride infusion   IntraVENous PRN Alejandro Ruiz MD        potassium chloride (KLOR-CON M) extended release tablet 40 mEq  40 mEq Oral PRN Alejandro Ruiz MD        Or    potassium bicarb-citric acid (EFFER-K) effervescent tablet 40 mEq  40 mEq Oral PRN Alejandro Ruiz MD        Or    potassium chloride 10 mEq/100 mL IVPB (Peripheral Line)  10 mEq IntraVENous PRN Alejandro Ruiz MD        magnesium sulfate 2000 mg in 50 mL IVPB premix  2,000 mg IntraVENous PRN Alejandro Ruiz MD        ondansetron (ZOFRAN-ODT) disintegrating tablet 4 mg  4 mg Oral Q8H PRN Alejandro Ruiz MD        Or    ondansetron (ZOFRAN) injection 4 mg  4 mg IntraVENous Q6H PRN Alejandro Ruiz MD        polyethylene glycol (GLYCOLAX) packet 17 g  17 g Oral Daily Alejandro Ruiz MD   17 g at 01/05/24 0818    acetaminophen (TYLENOL) tablet 650 mg  650 mg Oral Q6H PRN Alejandro Ruiz MD        Or    acetaminophen (TYLENOL) suppository 650 mg  650 mg Rectal Q6H PRN Alejandro Ruiz MD        nicotine (NICODERM CQ) 21 MG/24HR 1 patch  1 patch TransDERmal Daily Alejandro Ruiz MD   1 patch at 01/05/24 0818    pantoprazole (PROTONIX) tablet 40 mg  40 mg Oral QAM AC Alejandro Ruiz MD   40 mg at 01/05/24 0818    vancomycin (VANCOCIN) 1500 mg in sodium chloride 0.9 % 250 mL IVPB  15 mg/kg IntraVENous Q12H Susannah Adkins,    Stopped at 01/05/24 0919       No Known Allergies    Surgical History  No past surgical history on file.

## 2024-01-06 ENCOUNTER — APPOINTMENT (OUTPATIENT)
Dept: ULTRASOUND IMAGING | Age: 39
DRG: 720 | End: 2024-01-06
Payer: COMMERCIAL

## 2024-01-06 LAB
ALBUMIN SERPL-MCNC: 2.9 G/DL (ref 3.5–5.2)
ALP SERPL-CCNC: 65 U/L (ref 40–129)
ALT SERPL-CCNC: 45 U/L (ref 0–40)
ANION GAP SERPL CALCULATED.3IONS-SCNC: 13 MMOL/L (ref 7–16)
AST SERPL-CCNC: 50 U/L (ref 0–39)
BASOPHILS # BLD: 0.06 K/UL (ref 0–0.2)
BASOPHILS NFR BLD: 0 % (ref 0–2)
BILIRUB SERPL-MCNC: 0.2 MG/DL (ref 0–1.2)
BUN SERPL-MCNC: 9 MG/DL (ref 6–20)
CALCIUM SERPL-MCNC: 9.2 MG/DL (ref 8.6–10.2)
CHLORIDE SERPL-SCNC: 100 MMOL/L (ref 98–107)
CO2 SERPL-SCNC: 21 MMOL/L (ref 22–29)
CREAT SERPL-MCNC: 0.9 MG/DL (ref 0.7–1.2)
EOSINOPHIL # BLD: 0.07 K/UL (ref 0.05–0.5)
EOSINOPHILS RELATIVE PERCENT: 1 % (ref 0–6)
ERYTHROCYTE [DISTWIDTH] IN BLOOD BY AUTOMATED COUNT: 14.6 % (ref 11.5–15)
GFR SERPL CREATININE-BSD FRML MDRD: >60 ML/MIN/1.73M2
GLUCOSE SERPL-MCNC: 87 MG/DL (ref 74–99)
HCT VFR BLD AUTO: 21.6 % (ref 37–54)
HCT VFR BLD AUTO: 21.8 % (ref 37–54)
HGB BLD-MCNC: 7 G/DL (ref 12.5–16.5)
HGB BLD-MCNC: 7.1 G/DL (ref 12.5–16.5)
IMM GRANULOCYTES # BLD AUTO: 0.13 K/UL (ref 0–0.58)
IMM GRANULOCYTES NFR BLD: 1 % (ref 0–5)
LYMPHOCYTES NFR BLD: 1.84 K/UL (ref 1.5–4)
LYMPHOCYTES RELATIVE PERCENT: 13 % (ref 20–42)
MCH RBC QN AUTO: 24.9 PG (ref 26–35)
MCHC RBC AUTO-ENTMCNC: 32.6 G/DL (ref 32–34.5)
MCV RBC AUTO: 76.5 FL (ref 80–99.9)
MONOCYTES NFR BLD: 0.93 K/UL (ref 0.1–0.95)
MONOCYTES NFR BLD: 6 % (ref 2–12)
NEUTROPHILS NFR BLD: 79 % (ref 43–80)
NEUTS SEG NFR BLD: 11.65 K/UL (ref 1.8–7.3)
PLATELET # BLD AUTO: 720 K/UL (ref 130–450)
PMV BLD AUTO: 9 FL (ref 7–12)
POTASSIUM SERPL-SCNC: 4 MMOL/L (ref 3.5–5)
PROT SERPL-MCNC: 6.7 G/DL (ref 6.4–8.3)
RBC # BLD AUTO: 2.85 M/UL (ref 3.8–5.8)
SODIUM SERPL-SCNC: 134 MMOL/L (ref 132–146)
WBC OTHER # BLD: 14.7 K/UL (ref 4.5–11.5)

## 2024-01-06 PROCEDURE — 6370000000 HC RX 637 (ALT 250 FOR IP)

## 2024-01-06 PROCEDURE — 85025 COMPLETE CBC W/AUTO DIFF WBC: CPT

## 2024-01-06 PROCEDURE — 2580000003 HC RX 258

## 2024-01-06 PROCEDURE — 99232 SBSQ HOSP IP/OBS MODERATE 35: CPT | Performed by: FAMILY MEDICINE

## 2024-01-06 PROCEDURE — 80053 COMPREHEN METABOLIC PANEL: CPT

## 2024-01-06 PROCEDURE — 99231 SBSQ HOSP IP/OBS SF/LOW 25: CPT | Performed by: SURGERY

## 2024-01-06 PROCEDURE — 36415 COLL VENOUS BLD VENIPUNCTURE: CPT

## 2024-01-06 PROCEDURE — 6360000002 HC RX W HCPCS

## 2024-01-06 PROCEDURE — 6370000000 HC RX 637 (ALT 250 FOR IP): Performed by: INTERNAL MEDICINE

## 2024-01-06 PROCEDURE — 85018 HEMOGLOBIN: CPT

## 2024-01-06 PROCEDURE — 2060000000 HC ICU INTERMEDIATE R&B

## 2024-01-06 PROCEDURE — 76770 US EXAM ABDO BACK WALL COMP: CPT

## 2024-01-06 PROCEDURE — 85014 HEMATOCRIT: CPT

## 2024-01-06 RX ORDER — OXYCODONE HYDROCHLORIDE 5 MG/1
5 TABLET ORAL EVERY 6 HOURS PRN
Status: DISCONTINUED | OUTPATIENT
Start: 2024-01-06 | End: 2024-01-07

## 2024-01-06 RX ADMIN — PANTOPRAZOLE SODIUM 40 MG: 40 TABLET, DELAYED RELEASE ORAL at 15:39

## 2024-01-06 RX ADMIN — LINEZOLID 600 MG: 600 TABLET, FILM COATED ORAL at 08:15

## 2024-01-06 RX ADMIN — ACETAMINOPHEN 650 MG: 325 TABLET ORAL at 20:02

## 2024-01-06 RX ADMIN — ACETAMINOPHEN 650 MG: 325 TABLET ORAL at 05:49

## 2024-01-06 RX ADMIN — MORPHINE SULFATE 4 MG: 4 INJECTION, SOLUTION INTRAMUSCULAR; INTRAVENOUS at 08:31

## 2024-01-06 RX ADMIN — LINEZOLID 600 MG: 600 TABLET, FILM COATED ORAL at 20:02

## 2024-01-06 RX ADMIN — ACETAMINOPHEN 650 MG: 325 TABLET ORAL at 15:38

## 2024-01-06 RX ADMIN — Medication 10 ML: at 20:03

## 2024-01-06 RX ADMIN — PANTOPRAZOLE SODIUM 40 MG: 40 TABLET, DELAYED RELEASE ORAL at 05:49

## 2024-01-06 RX ADMIN — Medication 10 ML: at 08:16

## 2024-01-06 RX ADMIN — MORPHINE SULFATE 4 MG: 4 INJECTION, SOLUTION INTRAMUSCULAR; INTRAVENOUS at 22:18

## 2024-01-06 RX ADMIN — MORPHINE SULFATE 4 MG: 4 INJECTION, SOLUTION INTRAMUSCULAR; INTRAVENOUS at 01:30

## 2024-01-06 ASSESSMENT — PAIN DESCRIPTION - DESCRIPTORS
DESCRIPTORS: ACHING
DESCRIPTORS: ACHING;DISCOMFORT
DESCRIPTORS: ACHING

## 2024-01-06 ASSESSMENT — PAIN SCALES - GENERAL
PAINLEVEL_OUTOF10: 7
PAINLEVEL_OUTOF10: 7
PAINLEVEL_OUTOF10: 0
PAINLEVEL_OUTOF10: 0
PAINLEVEL_OUTOF10: 8
PAINLEVEL_OUTOF10: 7
PAINLEVEL_OUTOF10: 0
PAINLEVEL_OUTOF10: 7
PAINLEVEL_OUTOF10: 6
PAINLEVEL_OUTOF10: 6

## 2024-01-06 ASSESSMENT — PAIN DESCRIPTION - LOCATION
LOCATION: GROIN
LOCATION: GROIN
LOCATION: GROIN;LEG
LOCATION: GROIN
LOCATION: LEG
LOCATION: GROIN
LOCATION: LEG

## 2024-01-06 ASSESSMENT — PAIN DESCRIPTION - ORIENTATION
ORIENTATION: LEFT

## 2024-01-06 ASSESSMENT — PAIN - FUNCTIONAL ASSESSMENT
PAIN_FUNCTIONAL_ASSESSMENT: PREVENTS OR INTERFERES SOME ACTIVE ACTIVITIES AND ADLS
PAIN_FUNCTIONAL_ASSESSMENT: ACTIVITIES ARE NOT PREVENTED
PAIN_FUNCTIONAL_ASSESSMENT: PREVENTS OR INTERFERES SOME ACTIVE ACTIVITIES AND ADLS

## 2024-01-06 NOTE — PLAN OF CARE
Problem: Discharge Planning  Goal: Discharge to home or other facility with appropriate resources  1/6/2024 0211 by Roxana Smith RN  Outcome: Progressing     Problem: Safety - Adult  Goal: Free from fall injury  Outcome: Progressing     Problem: Pain  Goal: Verbalizes/displays adequate comfort level or baseline comfort level  1/6/2024 1225 by Becky Fox, RN  Outcome: Progressing  1/6/2024 1225 by Becky Fox, RN  Outcome: Progressing

## 2024-01-06 NOTE — PLAN OF CARE
Problem: Discharge Planning  Goal: Discharge to home or other facility with appropriate resources  1/6/2024 0211 by Roxana Smith RN  Outcome: Progressing  1/5/2024 1818 by Alona Slade RN  Outcome: Progressing  1/5/2024 1817 by Alona Slade, RN  Outcome: Progressing

## 2024-01-06 NOTE — CARE COORDINATION
Social Work Discharge Planning:  SW met with patient explained role and discussed transition of care.Patient lives in a two story home with significant other. Patient independent prior to admission.PCP is Dr. Soriano. Patient is anxious to leave, stating \"I'm ready to go\" Patient's significant other will transport home. SW will continue to follow.  Electronically signed by NELSON Zamora on 1/5/2024 at 7:49 PM

## 2024-01-07 ENCOUNTER — APPOINTMENT (OUTPATIENT)
Age: 39
DRG: 720 | End: 2024-01-07
Payer: COMMERCIAL

## 2024-01-07 LAB
ALBUMIN SERPL-MCNC: 3.1 G/DL (ref 3.5–5.2)
ALP SERPL-CCNC: 66 U/L (ref 40–129)
ALT SERPL-CCNC: 47 U/L (ref 0–40)
ANION GAP SERPL CALCULATED.3IONS-SCNC: 15 MMOL/L (ref 7–16)
AST SERPL-CCNC: 38 U/L (ref 0–39)
BASOPHILS # BLD: 0.05 K/UL (ref 0–0.2)
BASOPHILS NFR BLD: 0 % (ref 0–2)
BILIRUB SERPL-MCNC: 0.2 MG/DL (ref 0–1.2)
BUN SERPL-MCNC: 8 MG/DL (ref 6–20)
CALCIUM SERPL-MCNC: 9.5 MG/DL (ref 8.6–10.2)
CHLORIDE SERPL-SCNC: 99 MMOL/L (ref 98–107)
CO2 SERPL-SCNC: 21 MMOL/L (ref 22–29)
CREAT SERPL-MCNC: 0.8 MG/DL (ref 0.7–1.2)
ECHO AO ASC DIAM: 2.8 CM
ECHO AO ASCENDING AORTA INDEX: 1.31 CM/M2
ECHO AV AREA PEAK VELOCITY: 3.1 CM2
ECHO AV AREA VTI: 2.7 CM2
ECHO AV AREA/BSA PEAK VELOCITY: 1.5 CM2/M2
ECHO AV AREA/BSA VTI: 1.3 CM2/M2
ECHO AV CUSP MM: 1.8 CM
ECHO AV MEAN GRADIENT: 7 MMHG
ECHO AV MEAN VELOCITY: 1.3 M/S
ECHO AV PEAK GRADIENT: 12 MMHG
ECHO AV PEAK VELOCITY: 1.7 M/S
ECHO AV VELOCITY RATIO: 0.94
ECHO AV VTI: 25.5 CM
ECHO BSA: 2.17 M2
ECHO LA DIAMETER INDEX: 1.46 CM/M2
ECHO LA DIAMETER: 3.1 CM
ECHO LA VOL A-L A2C: 44 ML (ref 18–58)
ECHO LA VOL A-L A4C: 50 ML (ref 18–58)
ECHO LA VOL MOD A2C: 40 ML (ref 18–58)
ECHO LA VOL MOD A4C: 47 ML (ref 18–58)
ECHO LA VOLUME AREA LENGTH: 47 ML
ECHO LA VOLUME INDEX A-L A2C: 21 ML/M2 (ref 16–34)
ECHO LA VOLUME INDEX A-L A4C: 23 ML/M2 (ref 16–34)
ECHO LA VOLUME INDEX AREA LENGTH: 22 ML/M2 (ref 16–34)
ECHO LA VOLUME INDEX MOD A2C: 19 ML/M2 (ref 16–34)
ECHO LA VOLUME INDEX MOD A4C: 22 ML/M2 (ref 16–34)
ECHO LV FRACTIONAL SHORTENING: 27 % (ref 28–44)
ECHO LV INTERNAL DIMENSION DIASTOLE INDEX: 1.92 CM/M2
ECHO LV INTERNAL DIMENSION DIASTOLIC: 4.1 CM (ref 4.2–5.9)
ECHO LV INTERNAL DIMENSION SYSTOLIC INDEX: 1.41 CM/M2
ECHO LV INTERNAL DIMENSION SYSTOLIC: 3 CM
ECHO LV ISOVOLUMETRIC RELAXATION TIME (IVRT): 55.4 MS
ECHO LV IVSD: 1.1 CM (ref 0.6–1)
ECHO LV MASS 2D: 141.5 G (ref 88–224)
ECHO LV MASS INDEX 2D: 66.5 G/M2 (ref 49–115)
ECHO LV POSTERIOR WALL DIASTOLIC: 1 CM (ref 0.6–1)
ECHO LV RELATIVE WALL THICKNESS RATIO: 0.49
ECHO LVOT AREA: 3.1 CM2
ECHO LVOT AV VTI INDEX: 0.84
ECHO LVOT DIAM: 2 CM
ECHO LVOT MEAN GRADIENT: 6 MMHG
ECHO LVOT PEAK GRADIENT: 11 MMHG
ECHO LVOT PEAK VELOCITY: 1.6 M/S
ECHO LVOT STROKE VOLUME INDEX: 31.4 ML/M2
ECHO LVOT SV: 66.9 ML
ECHO LVOT VTI: 21.3 CM
ECHO MV "A" WAVE DURATION: 87.7 MSEC
ECHO MV A VELOCITY: 0.76 M/S
ECHO MV AREA PHT: 5.4 CM2
ECHO MV AREA VTI: 4.1 CM2
ECHO MV E DECELERATION TIME (DT): 132.9 MS
ECHO MV E VELOCITY: 0.61 M/S
ECHO MV E/A RATIO: 0.8
ECHO MV LVOT VTI INDEX: 0.76
ECHO MV MAX VELOCITY: 0.8 M/S
ECHO MV MEAN GRADIENT: 2 MMHG
ECHO MV MEAN VELOCITY: 0.6 M/S
ECHO MV PEAK GRADIENT: 3 MMHG
ECHO MV PRESSURE HALF TIME (PHT): 40.5 MS
ECHO MV VTI: 16.2 CM
ECHO PV MAX VELOCITY: 1.1 M/S
ECHO PV MEAN GRADIENT: 2 MMHG
ECHO PV MEAN VELOCITY: 0.7 M/S
ECHO PV PEAK GRADIENT: 5 MMHG
ECHO PV VTI: 16.6 CM
ECHO RV INTERNAL DIMENSION: 2.5 CM
EOSINOPHIL # BLD: 0.07 K/UL (ref 0.05–0.5)
EOSINOPHILS RELATIVE PERCENT: 1 % (ref 0–6)
ERYTHROCYTE [DISTWIDTH] IN BLOOD BY AUTOMATED COUNT: 14.8 % (ref 11.5–15)
GFR SERPL CREATININE-BSD FRML MDRD: >60 ML/MIN/1.73M2
GLUCOSE SERPL-MCNC: 84 MG/DL (ref 74–99)
HCT VFR BLD AUTO: 23.5 % (ref 37–54)
HGB BLD-MCNC: 7.6 G/DL (ref 12.5–16.5)
IMM GRANULOCYTES # BLD AUTO: 0.1 K/UL (ref 0–0.58)
IMM GRANULOCYTES NFR BLD: 1 % (ref 0–5)
LYMPHOCYTES NFR BLD: 1.95 K/UL (ref 1.5–4)
LYMPHOCYTES RELATIVE PERCENT: 13 % (ref 20–42)
MCH RBC QN AUTO: 24.8 PG (ref 26–35)
MCHC RBC AUTO-ENTMCNC: 32.3 G/DL (ref 32–34.5)
MCV RBC AUTO: 76.5 FL (ref 80–99.9)
MICROORGANISM SPEC CULT: ABNORMAL
MICROORGANISM/AGENT SPEC: ABNORMAL
MONOCYTES NFR BLD: 0.93 K/UL (ref 0.1–0.95)
MONOCYTES NFR BLD: 6 % (ref 2–12)
NEUTROPHILS NFR BLD: 79 % (ref 43–80)
NEUTS SEG NFR BLD: 11.72 K/UL (ref 1.8–7.3)
PLATELET # BLD AUTO: 954 K/UL (ref 130–450)
PMV BLD AUTO: 8.8 FL (ref 7–12)
POTASSIUM SERPL-SCNC: 4.5 MMOL/L (ref 3.5–5)
PROT SERPL-MCNC: 7.3 G/DL (ref 6.4–8.3)
RBC # BLD AUTO: 3.07 M/UL (ref 3.8–5.8)
SODIUM SERPL-SCNC: 135 MMOL/L (ref 132–146)
SPECIMEN DESCRIPTION: ABNORMAL
WBC OTHER # BLD: 14.8 K/UL (ref 4.5–11.5)

## 2024-01-07 PROCEDURE — 6370000000 HC RX 637 (ALT 250 FOR IP)

## 2024-01-07 PROCEDURE — 2060000000 HC ICU INTERMEDIATE R&B

## 2024-01-07 PROCEDURE — 99232 SBSQ HOSP IP/OBS MODERATE 35: CPT | Performed by: FAMILY MEDICINE

## 2024-01-07 PROCEDURE — 6370000000 HC RX 637 (ALT 250 FOR IP): Performed by: INTERNAL MEDICINE

## 2024-01-07 PROCEDURE — 85025 COMPLETE CBC W/AUTO DIFF WBC: CPT

## 2024-01-07 PROCEDURE — 36415 COLL VENOUS BLD VENIPUNCTURE: CPT

## 2024-01-07 PROCEDURE — 2580000003 HC RX 258

## 2024-01-07 PROCEDURE — 6370000000 HC RX 637 (ALT 250 FOR IP): Performed by: STUDENT IN AN ORGANIZED HEALTH CARE EDUCATION/TRAINING PROGRAM

## 2024-01-07 PROCEDURE — 93306 TTE W/DOPPLER COMPLETE: CPT | Performed by: INTERNAL MEDICINE

## 2024-01-07 PROCEDURE — 93306 TTE W/DOPPLER COMPLETE: CPT

## 2024-01-07 PROCEDURE — 80053 COMPREHEN METABOLIC PANEL: CPT

## 2024-01-07 RX ORDER — OXYCODONE HYDROCHLORIDE AND ACETAMINOPHEN 5; 325 MG/1; MG/1
1 TABLET ORAL EVERY 6 HOURS PRN
Status: DISCONTINUED | OUTPATIENT
Start: 2024-01-07 | End: 2024-01-07

## 2024-01-07 RX ORDER — FERROUS SULFATE 325(65) MG
325 TABLET ORAL
Status: DISCONTINUED | OUTPATIENT
Start: 2024-01-07 | End: 2024-01-09 | Stop reason: HOSPADM

## 2024-01-07 RX ORDER — ACETAMINOPHEN 650 MG/1
650 SUPPOSITORY RECTAL EVERY 8 HOURS SCHEDULED
Status: DISCONTINUED | OUTPATIENT
Start: 2024-01-07 | End: 2024-01-09 | Stop reason: HOSPADM

## 2024-01-07 RX ORDER — ACETAMINOPHEN 325 MG/1
650 TABLET ORAL EVERY 6 HOURS
Status: DISCONTINUED | OUTPATIENT
Start: 2024-01-07 | End: 2024-01-07

## 2024-01-07 RX ORDER — ACETAMINOPHEN 650 MG/1
650 SUPPOSITORY RECTAL EVERY 6 HOURS
Status: DISCONTINUED | OUTPATIENT
Start: 2024-01-07 | End: 2024-01-07

## 2024-01-07 RX ORDER — ACETAMINOPHEN 325 MG/1
650 TABLET ORAL EVERY 8 HOURS SCHEDULED
Status: DISCONTINUED | OUTPATIENT
Start: 2024-01-07 | End: 2024-01-07

## 2024-01-07 RX ORDER — ACETAMINOPHEN 325 MG/1
650 TABLET ORAL EVERY 8 HOURS SCHEDULED
Status: DISCONTINUED | OUTPATIENT
Start: 2024-01-07 | End: 2024-01-09 | Stop reason: HOSPADM

## 2024-01-07 RX ORDER — OXYCODONE HYDROCHLORIDE AND ACETAMINOPHEN 5; 325 MG/1; MG/1
1 TABLET ORAL EVERY 12 HOURS PRN
Status: DISCONTINUED | OUTPATIENT
Start: 2024-01-07 | End: 2024-01-09 | Stop reason: HOSPADM

## 2024-01-07 RX ORDER — ACETAMINOPHEN 650 MG/1
650 SUPPOSITORY RECTAL EVERY 8 HOURS SCHEDULED
Status: DISCONTINUED | OUTPATIENT
Start: 2024-01-07 | End: 2024-01-07

## 2024-01-07 RX ADMIN — FERROUS SULFATE TAB 325 MG (65 MG ELEMENTAL FE) 325 MG: 325 (65 FE) TAB at 09:17

## 2024-01-07 RX ADMIN — Medication 10 ML: at 09:19

## 2024-01-07 RX ADMIN — OXYCODONE 5 MG: 5 TABLET ORAL at 04:47

## 2024-01-07 RX ADMIN — PANTOPRAZOLE SODIUM 40 MG: 40 TABLET, DELAYED RELEASE ORAL at 04:47

## 2024-01-07 RX ADMIN — LINEZOLID 600 MG: 600 TABLET, FILM COATED ORAL at 09:17

## 2024-01-07 RX ADMIN — ACETAMINOPHEN 650 MG: 325 TABLET ORAL at 15:59

## 2024-01-07 RX ADMIN — PANTOPRAZOLE SODIUM 40 MG: 40 TABLET, DELAYED RELEASE ORAL at 15:59

## 2024-01-07 RX ADMIN — OXYCODONE AND ACETAMINOPHEN 1 TABLET: 5; 325 TABLET ORAL at 20:49

## 2024-01-07 RX ADMIN — ACETAMINOPHEN 650 MG: 325 TABLET ORAL at 09:17

## 2024-01-07 RX ADMIN — Medication 10 ML: at 20:49

## 2024-01-07 RX ADMIN — LINEZOLID 600 MG: 600 TABLET, FILM COATED ORAL at 20:49

## 2024-01-07 ASSESSMENT — PAIN SCALES - GENERAL
PAINLEVEL_OUTOF10: 6
PAINLEVEL_OUTOF10: 8
PAINLEVEL_OUTOF10: 1
PAINLEVEL_OUTOF10: 8

## 2024-01-07 ASSESSMENT — PAIN DESCRIPTION - DESCRIPTORS
DESCRIPTORS: ACHING
DESCRIPTORS: THROBBING
DESCRIPTORS: ACHING;DISCOMFORT
DESCRIPTORS: ACHING;DISCOMFORT

## 2024-01-07 ASSESSMENT — PAIN DESCRIPTION - ORIENTATION
ORIENTATION: LEFT;LOWER
ORIENTATION: LEFT

## 2024-01-07 ASSESSMENT — PAIN DESCRIPTION - LOCATION
LOCATION: GROIN;LEG
LOCATION: GROIN;BACK
LOCATION: LEG;GROIN;BACK
LOCATION: GROIN

## 2024-01-07 ASSESSMENT — PAIN - FUNCTIONAL ASSESSMENT
PAIN_FUNCTIONAL_ASSESSMENT: ACTIVITIES ARE NOT PREVENTED
PAIN_FUNCTIONAL_ASSESSMENT: ACTIVITIES ARE NOT PREVENTED

## 2024-01-08 DIAGNOSIS — Z48.03 CHANGE OR REMOVAL OF DRAINS: Primary | ICD-10-CM

## 2024-01-08 LAB
ALBUMIN SERPL-MCNC: 3.1 G/DL (ref 3.5–5.2)
ALP SERPL-CCNC: 80 U/L (ref 40–129)
ALT SERPL-CCNC: 40 U/L (ref 0–40)
ANION GAP SERPL CALCULATED.3IONS-SCNC: 12 MMOL/L (ref 7–16)
AST SERPL-CCNC: 29 U/L (ref 0–39)
BASOPHILS # BLD: 0.06 K/UL (ref 0–0.2)
BASOPHILS NFR BLD: 0 % (ref 0–2)
BILIRUB SERPL-MCNC: <0.2 MG/DL (ref 0–1.2)
BUN SERPL-MCNC: 11 MG/DL (ref 6–20)
CALCIUM SERPL-MCNC: 9.4 MG/DL (ref 8.6–10.2)
CHLORIDE SERPL-SCNC: 104 MMOL/L (ref 98–107)
CO2 SERPL-SCNC: 23 MMOL/L (ref 22–29)
CREAT SERPL-MCNC: 1 MG/DL (ref 0.7–1.2)
EOSINOPHIL # BLD: 0.07 K/UL (ref 0.05–0.5)
EOSINOPHILS RELATIVE PERCENT: 1 % (ref 0–6)
ERYTHROCYTE [DISTWIDTH] IN BLOOD BY AUTOMATED COUNT: 14.9 % (ref 11.5–15)
GFR SERPL CREATININE-BSD FRML MDRD: >60 ML/MIN/1.73M2
GLUCOSE SERPL-MCNC: 106 MG/DL (ref 74–99)
HCT VFR BLD AUTO: 24.7 % (ref 37–54)
HGB BLD-MCNC: 7.7 G/DL (ref 12.5–16.5)
IMM GRANULOCYTES # BLD AUTO: 0.12 K/UL (ref 0–0.58)
IMM GRANULOCYTES NFR BLD: 1 % (ref 0–5)
LYMPHOCYTES NFR BLD: 1.87 K/UL (ref 1.5–4)
LYMPHOCYTES RELATIVE PERCENT: 14 % (ref 20–42)
MCH RBC QN AUTO: 24.3 PG (ref 26–35)
MCHC RBC AUTO-ENTMCNC: 31.2 G/DL (ref 32–34.5)
MCV RBC AUTO: 77.9 FL (ref 80–99.9)
MONOCYTES NFR BLD: 0.77 K/UL (ref 0.1–0.95)
MONOCYTES NFR BLD: 6 % (ref 2–12)
NEUTROPHILS NFR BLD: 79 % (ref 43–80)
NEUTS SEG NFR BLD: 10.62 K/UL (ref 1.8–7.3)
PLATELET, FLUORESCENCE: 1192 K/UL (ref 130–450)
PMV BLD AUTO: 8.8 FL (ref 7–12)
POTASSIUM SERPL-SCNC: 4.1 MMOL/L (ref 3.5–5)
PROT SERPL-MCNC: 7.2 G/DL (ref 6.4–8.3)
RBC # BLD AUTO: 3.17 M/UL (ref 3.8–5.8)
SODIUM SERPL-SCNC: 139 MMOL/L (ref 132–146)
WBC OTHER # BLD: 13.5 K/UL (ref 4.5–11.5)

## 2024-01-08 PROCEDURE — 99232 SBSQ HOSP IP/OBS MODERATE 35: CPT | Performed by: FAMILY MEDICINE

## 2024-01-08 PROCEDURE — 85025 COMPLETE CBC W/AUTO DIFF WBC: CPT

## 2024-01-08 PROCEDURE — 97535 SELF CARE MNGMENT TRAINING: CPT

## 2024-01-08 PROCEDURE — 97165 OT EVAL LOW COMPLEX 30 MIN: CPT

## 2024-01-08 PROCEDURE — 6370000000 HC RX 637 (ALT 250 FOR IP)

## 2024-01-08 PROCEDURE — 80053 COMPREHEN METABOLIC PANEL: CPT

## 2024-01-08 PROCEDURE — 6370000000 HC RX 637 (ALT 250 FOR IP): Performed by: INTERNAL MEDICINE

## 2024-01-08 PROCEDURE — 36415 COLL VENOUS BLD VENIPUNCTURE: CPT

## 2024-01-08 PROCEDURE — 97161 PT EVAL LOW COMPLEX 20 MIN: CPT

## 2024-01-08 PROCEDURE — 2060000000 HC ICU INTERMEDIATE R&B

## 2024-01-08 PROCEDURE — 2580000003 HC RX 258

## 2024-01-08 PROCEDURE — 97530 THERAPEUTIC ACTIVITIES: CPT

## 2024-01-08 RX ADMIN — LINEZOLID 600 MG: 600 TABLET, FILM COATED ORAL at 21:28

## 2024-01-08 RX ADMIN — ACETAMINOPHEN 650 MG: 325 TABLET ORAL at 00:46

## 2024-01-08 RX ADMIN — LINEZOLID 600 MG: 600 TABLET, FILM COATED ORAL at 08:10

## 2024-01-08 RX ADMIN — OXYCODONE AND ACETAMINOPHEN 1 TABLET: 5; 325 TABLET ORAL at 21:30

## 2024-01-08 RX ADMIN — OXYCODONE AND ACETAMINOPHEN 1 TABLET: 5; 325 TABLET ORAL at 09:31

## 2024-01-08 RX ADMIN — Medication 10 ML: at 21:28

## 2024-01-08 RX ADMIN — ACETAMINOPHEN 650 MG: 325 TABLET ORAL at 06:40

## 2024-01-08 RX ADMIN — ACETAMINOPHEN 650 MG: 325 TABLET ORAL at 16:35

## 2024-01-08 RX ADMIN — Medication 10 ML: at 08:11

## 2024-01-08 RX ADMIN — ACETAMINOPHEN 650 MG: 325 TABLET ORAL at 23:25

## 2024-01-08 RX ADMIN — PANTOPRAZOLE SODIUM 40 MG: 40 TABLET, DELAYED RELEASE ORAL at 16:35

## 2024-01-08 RX ADMIN — PANTOPRAZOLE SODIUM 40 MG: 40 TABLET, DELAYED RELEASE ORAL at 06:40

## 2024-01-08 ASSESSMENT — PAIN DESCRIPTION - LOCATION
LOCATION: BACK
LOCATION: LEG
LOCATION: GROIN;BACK
LOCATION: BACK;GROIN
LOCATION: BACK
LOCATION: LEG;BACK
LOCATION: BACK
LOCATION: LEG;GROIN

## 2024-01-08 ASSESSMENT — PAIN SCALES - GENERAL
PAINLEVEL_OUTOF10: 5
PAINLEVEL_OUTOF10: 7
PAINLEVEL_OUTOF10: 6
PAINLEVEL_OUTOF10: 7
PAINLEVEL_OUTOF10: 8
PAINLEVEL_OUTOF10: 4
PAINLEVEL_OUTOF10: 0
PAINLEVEL_OUTOF10: 8
PAINLEVEL_OUTOF10: 6
PAINLEVEL_OUTOF10: 6

## 2024-01-08 ASSESSMENT — PAIN DESCRIPTION - ORIENTATION
ORIENTATION: LEFT;LOWER
ORIENTATION: LEFT;LOWER
ORIENTATION: LEFT

## 2024-01-08 ASSESSMENT — PAIN - FUNCTIONAL ASSESSMENT
PAIN_FUNCTIONAL_ASSESSMENT: ACTIVITIES ARE NOT PREVENTED

## 2024-01-08 ASSESSMENT — PAIN DESCRIPTION - DESCRIPTORS
DESCRIPTORS: THROBBING
DESCRIPTORS: ACHING
DESCRIPTORS: ACHING;DISCOMFORT
DESCRIPTORS: ACHING;DISCOMFORT
DESCRIPTORS: DISCOMFORT;SHOOTING;THROBBING

## 2024-01-08 NOTE — PLAN OF CARE
Problem: Safety - Adult  Goal: Free from fall injury  Outcome: Progressing     Problem: Pain  Goal: Verbalizes/displays adequate comfort level or baseline comfort level  Outcome: Progressing

## 2024-01-08 NOTE — DISCHARGE INSTRUCTIONS
=====================================  [facility]  DISCHARGE INSTRUCTIONS  =====================================    Take your medications as directed in this summary    Future scheduled appointments are listed below or recommended appointments are mentioned above.    You may take Tylenol if needed for pain (No more than 2000mg of Tylenol daily)    Future Appointments   Date Time Provider Department Center   1/9/2024 12:45 PM Sami Blake MD [facility]   1/12/2024  9:40 AM Rivka Carrillo MD [facility]         Once discharged from [facility], you can :     Return to work : After PCP follow-up  Activity : As tolerated  Stairs : As tolerated  Exercise : As tolerated  Lifting : As tolerated   Sexual activity : Yes  Driving : NO driving on narcotic pain medication  Medications : Always take your medications as prescribed  Wound Care: Home health care, will need to follow-up with IR (Interventional Radiology)  Diet : You are asked to make an attempt to improve diet and exercise patterns to aid in medical management of your medical condition/problem.    Return to Emergency Department with any worsening of your condition and/or fever greater than 101 degrees, new weakness, shortness of breath or chest pain.      DRAIN CARE  Flush drain daily using the following instructions:    Prepare a clean area. Gather these supplies for each drain:  ? 1 pre-filled syringe (10 milliliters [mL] of sterile normal saline)  ? 1 alcohol wipe    1. Disconnect the drainage bag from the drainage tube at the connection site. Lay it down on a clean surface.  2. Clean the end of the drainage tube with an alcohol wipe and attach the syringe.  3. Gently inject 5 to 10 mL. Make sure the drain is straight, not folded or kinked.  4. Connect the bag to the drain.  5. Wash your hands.    Record drain output     Please call surgeon Dr. Zheng for appointment

## 2024-01-08 NOTE — CARE COORDINATION
1/8/24 Update CM Note. Pt admitted 1/4/23 for abscess with drain placed to left thigh/groin.  CT guided abscess drained  with drain placement.   Order for [facility].Pt does not have preference to provider.  [facility] following and can take pt with SOC date 1/11/24.  Referral to [place] to check for earlier start date.  [place] declined.  Referral to [facility],, awaiting response.  Plans to return home with sig other and she will provide transport.   iMrta FRIEDN RN-BC  [phone]    Addendum: [facility] declined referral. Referral to [facility] -next start of care is 1/11/24 also. Planning home with [facility],start of care date 1/11/24

## 2024-01-09 VITALS
SYSTOLIC BLOOD PRESSURE: 121 MMHG | OXYGEN SATURATION: 97 % | TEMPERATURE: 97.4 F | HEIGHT: 70 IN | HEART RATE: 94 BPM | BODY MASS INDEX: 30.13 KG/M2 | RESPIRATION RATE: 18 BRPM | DIASTOLIC BLOOD PRESSURE: 69 MMHG

## 2024-01-09 LAB
ALBUMIN SERPL-MCNC: 3.4 G/DL (ref 3.5–5.2)
ALP SERPL-CCNC: 76 U/L (ref 40–129)
ALT SERPL-CCNC: 38 U/L (ref 0–40)
ANION GAP SERPL CALCULATED.3IONS-SCNC: 15 MMOL/L (ref 7–16)
AST SERPL-CCNC: 27 U/L (ref 0–39)
BASOPHILS # BLD: 0.07 K/UL (ref 0–0.2)
BASOPHILS NFR BLD: 1 % (ref 0–2)
BILIRUB SERPL-MCNC: <0.2 MG/DL (ref 0–1.2)
BUN SERPL-MCNC: 11 MG/DL (ref 6–20)
CALCIUM SERPL-MCNC: 9.5 MG/DL (ref 8.6–10.2)
CHLORIDE SERPL-SCNC: 102 MMOL/L (ref 98–107)
CO2 SERPL-SCNC: 21 MMOL/L (ref 22–29)
CREAT SERPL-MCNC: 0.9 MG/DL (ref 0.7–1.2)
EOSINOPHIL # BLD: 0.06 K/UL (ref 0.05–0.5)
EOSINOPHILS RELATIVE PERCENT: 1 % (ref 0–6)
ERYTHROCYTE [DISTWIDTH] IN BLOOD BY AUTOMATED COUNT: 15.1 % (ref 11.5–15)
GFR SERPL CREATININE-BSD FRML MDRD: >60 ML/MIN/1.73M2
GLUCOSE SERPL-MCNC: 96 MG/DL (ref 74–99)
HCT VFR BLD AUTO: 25.2 % (ref 37–54)
HGB BLD-MCNC: 8.2 G/DL (ref 12.5–16.5)
IMM GRANULOCYTES # BLD AUTO: 0.06 K/UL (ref 0–0.58)
IMM GRANULOCYTES NFR BLD: 1 % (ref 0–5)
LYMPHOCYTES NFR BLD: 1.98 K/UL (ref 1.5–4)
LYMPHOCYTES RELATIVE PERCENT: 18 % (ref 20–42)
MCH RBC QN AUTO: 25 PG (ref 26–35)
MCHC RBC AUTO-ENTMCNC: 32.5 G/DL (ref 32–34.5)
MCV RBC AUTO: 76.8 FL (ref 80–99.9)
MICROORGANISM SPEC CULT: NORMAL
MICROORGANISM SPEC CULT: NORMAL
MONOCYTES NFR BLD: 0.74 K/UL (ref 0.1–0.95)
MONOCYTES NFR BLD: 7 % (ref 2–12)
NEUTROPHILS NFR BLD: 74 % (ref 43–80)
NEUTS SEG NFR BLD: 8.14 K/UL (ref 1.8–7.3)
PLATELET, FLUORESCENCE: 1263 K/UL (ref 130–450)
PMV BLD AUTO: 8.6 FL (ref 7–12)
POTASSIUM SERPL-SCNC: 4.2 MMOL/L (ref 3.5–5)
PROT SERPL-MCNC: 7.8 G/DL (ref 6.4–8.3)
RBC # BLD AUTO: 3.28 M/UL (ref 3.8–5.8)
SERVICE CMNT-IMP: NORMAL
SERVICE CMNT-IMP: NORMAL
SODIUM SERPL-SCNC: 138 MMOL/L (ref 132–146)
SPECIMEN DESCRIPTION: NORMAL
SPECIMEN DESCRIPTION: NORMAL
WBC OTHER # BLD: 11.1 K/UL (ref 4.5–11.5)

## 2024-01-09 PROCEDURE — 85025 COMPLETE CBC W/AUTO DIFF WBC: CPT

## 2024-01-09 PROCEDURE — 80053 COMPREHEN METABOLIC PANEL: CPT

## 2024-01-09 PROCEDURE — 6370000000 HC RX 637 (ALT 250 FOR IP)

## 2024-01-09 PROCEDURE — 99239 HOSP IP/OBS DSCHRG MGMT >30: CPT | Performed by: FAMILY MEDICINE

## 2024-01-09 PROCEDURE — 2580000003 HC RX 258

## 2024-01-09 PROCEDURE — 36415 COLL VENOUS BLD VENIPUNCTURE: CPT

## 2024-01-09 PROCEDURE — 6370000000 HC RX 637 (ALT 250 FOR IP): Performed by: INTERNAL MEDICINE

## 2024-01-09 RX ORDER — OXYCODONE HYDROCHLORIDE AND ACETAMINOPHEN 5; 325 MG/1; MG/1
1 TABLET ORAL EVERY 12 HOURS PRN
Qty: 5 TABLET | Refills: 0 | Status: SHIPPED | OUTPATIENT
Start: 2024-01-09 | End: 2024-01-12 | Stop reason: ALTCHOICE

## 2024-01-09 RX ORDER — FERROUS SULFATE 325(65) MG
325 TABLET ORAL
Qty: 30 TABLET | Refills: 3 | Status: SHIPPED | OUTPATIENT
Start: 2024-01-09

## 2024-01-09 RX ORDER — NICOTINE 21 MG/24HR
1 PATCH, TRANSDERMAL 24 HOURS TRANSDERMAL DAILY
Qty: 30 PATCH | Refills: 3 | Status: SHIPPED | OUTPATIENT
Start: 2024-01-09 | End: 2024-01-12

## 2024-01-09 RX ORDER — LINEZOLID 600 MG/1
600 TABLET, FILM COATED ORAL 2 TIMES DAILY
Qty: 18 TABLET | Refills: 0 | Status: SHIPPED | OUTPATIENT
Start: 2024-01-09 | End: 2024-01-18

## 2024-01-09 RX ORDER — PANTOPRAZOLE SODIUM 40 MG/1
40 TABLET, DELAYED RELEASE ORAL
Qty: 30 TABLET | Refills: 3 | Status: SHIPPED | OUTPATIENT
Start: 2024-01-09

## 2024-01-09 RX ADMIN — FERROUS SULFATE TAB 325 MG (65 MG ELEMENTAL FE) 325 MG: 325 (65 FE) TAB at 09:13

## 2024-01-09 RX ADMIN — ACETAMINOPHEN 650 MG: 325 TABLET ORAL at 06:38

## 2024-01-09 RX ADMIN — LINEZOLID 600 MG: 600 TABLET, FILM COATED ORAL at 09:13

## 2024-01-09 RX ADMIN — OXYCODONE AND ACETAMINOPHEN 1 TABLET: 5; 325 TABLET ORAL at 09:13

## 2024-01-09 RX ADMIN — Medication 10 ML: at 09:09

## 2024-01-09 RX ADMIN — PANTOPRAZOLE SODIUM 40 MG: 40 TABLET, DELAYED RELEASE ORAL at 06:38

## 2024-01-09 ASSESSMENT — PAIN SCALES - GENERAL
PAINLEVEL_OUTOF10: 5
PAINLEVEL_OUTOF10: 9
PAINLEVEL_OUTOF10: 7

## 2024-01-09 ASSESSMENT — PAIN DESCRIPTION - FREQUENCY
FREQUENCY: INTERMITTENT
FREQUENCY: INTERMITTENT

## 2024-01-09 ASSESSMENT — PAIN - FUNCTIONAL ASSESSMENT
PAIN_FUNCTIONAL_ASSESSMENT: PREVENTS OR INTERFERES SOME ACTIVE ACTIVITIES AND ADLS
PAIN_FUNCTIONAL_ASSESSMENT: PREVENTS OR INTERFERES SOME ACTIVE ACTIVITIES AND ADLS

## 2024-01-09 ASSESSMENT — PAIN DESCRIPTION - DESCRIPTORS
DESCRIPTORS: THROBBING;ACHING;DISCOMFORT
DESCRIPTORS: ACHING
DESCRIPTORS: THROBBING;ACHING;DISCOMFORT

## 2024-01-09 ASSESSMENT — PAIN DESCRIPTION - PAIN TYPE
TYPE: ACUTE PAIN
TYPE: ACUTE PAIN

## 2024-01-09 ASSESSMENT — PAIN DESCRIPTION - LOCATION
LOCATION: GROIN
LOCATION: GROIN
LOCATION: BACK

## 2024-01-09 ASSESSMENT — PAIN DESCRIPTION - ONSET
ONSET: ON-GOING
ONSET: ON-GOING

## 2024-01-09 ASSESSMENT — PAIN DESCRIPTION - ORIENTATION
ORIENTATION: LEFT
ORIENTATION: LEFT

## 2024-01-09 NOTE — DISCHARGE SUMMARY
Discharge Summary    Harmeet Doyle  :  1985  MRN:  [id number]    Admit date:  1/3/2024  Discharge date:  2024    Admitting Physician:  Roxy Anderson DO    Discharge Diagnoses:    Patient Active Problem List   Diagnosis    Hypertension    Abscess    Heart murmur       Admission Condition:  good    Discharged Condition:  good    Hospital Course:  Harmeet Doyle is a 38 y.o. male with a PMH of Depression/Anxiety who presented with complaints of left leg swelling and tenderness that had been present for two weeks. He described it as being a very tight sensation with a constant pain throughout the entire left leg with associated limited his mobility. Of note, the patient was incarcerated for 13 years. During his incarceration he endorses having recurring abscesses mainly on his armpits and groin. Patient smokes tobacco 1 pack per day and drinks about 2-3 22-ounce cans of beers daily. Denied IV drug use. ED workup remarkable for sinus tachycardia, leukocytosis, anemia and CT demonstrated fluid collection in the left adductor. He was then started on Vancomycin and Zosyn. General surgery consulted. Drain placed per IR on 24. Had 80cc purulent material at time of procedure. ID following and transitioned antibiotics to Linezolid. Wound culture positive for strep pneumoniae. Blood cultures negative. Had a 1.6cm area of right right hypoenhancement noted on CT. Renal ultrasound was then performed and was negative. Patient had 1 episode of hematochezia on hospitalization but decision was made by surgery not to perform scopes inpatient due to stable hemoglobin. He was started on Protonix 40mg BID. Patient remained stable, recovered and was in a suitable condition to be discharged home.      Discharge plan:   Follow-up with PCP: Monitor CBC, patient will require referral to general surgery/GI for evaluation of GI bleed, complete Linezolid course, decrease Protonix to once daily in 4-5 weeks, consider beta blocker

## 2024-01-09 NOTE — PROGRESS NOTES
OCCUPATIONAL THERAPY INITIAL EVALUATION    [facility]  [address], [place]        Date:2024                                                  Patient Name: Harmeet Doyle    MRN: [id number]    : 1985    Room: [address] [place] [address]          Evaluating OT: Felicitas Grant OTR/L; [id number]       Referring Provider: Alejandro Ruiz MD    Specific Provider Orders/Date: OT Eval and Treat 24       Diagnosis: Abscess; heart murmur     Surgery: 24 IR drain insertion     Pertinent Medical History:  has no past medical history on file.     Recommended Adaptive Equipment: TBD     Precautions:  Fall Risk, LONNIE drain groin, monitor HR     Assessment of current deficits    [x] Functional mobility  [x]ADLs  [x] Strength               []Cognition    [x] Functional transfers   [x] IADLs         [x] Safety Awareness   [x]Endurance    [x] Fine Coordination              [x] Balance      [] Vision/perception   []Sensation     []Gross Motor Coordination  [] ROM  [] Delirium                   [] Motor Control     OT PLAN OF CARE   OT POC based on physician orders, patient diagnosis and results of clinical assessment    Frequency/Duration 1-3 days/wk for 2 weeks PRN   Specific OT Treatment Interventions to include:   * Instruction/training on adapted ADL techniques and AE recommendations to increase functional independence within precautions       * Training on energy conservation strategies, correct breathing pattern and techniques to improve independence/tolerance for self-care routine  * Functional transfer/mobility training/DME recommendations for increased independence, safety, and fall prevention  * Patient/Family education to increase follow through with safety techniques and functional independence  * Recommendation of environmental modifications for increased safety with functional transfers/mobility and ADLs  * Therapeutic exercise to improve motor 
  Physician Progress Note      PATIENT:               MIRACLE DE LEON  CSN #:                  [id number]  :                       1985  ADMIT DATE:       1/3/2024 7:31 PM  DISCH DATE:  RESPONDING  PROVIDER #:        Michelle Denney DO          QUERY TEXT:    Pt admitted with left intramuscular groin abscess. Pt noted to have WBC 21.2,   lactic acid 2.0. If possible, please document in the progress notes and   discharge summary if you are evaluating and /or treating any of the following:    The medical record reflects the following:  Risk Factors: groin abscess  Clinical Indicators: , resp 20  Treatment: surgery consult, IR consult, CT guided abscess drainage and   catheter placement, IVF, IV Zosyn, IV Vancomycin    FARIHA DerasN, RN, CRCR  Clinical   [phone]  Options provided:  -- Sepsis, present on admission  -- Sepsis was ruled out  -- Other - I will add my own diagnosis  -- Disagree - Not applicable / Not valid  -- Disagree - Clinically unable to determine / Unknown  -- Refer to Clinical Documentation Reviewer    PROVIDER RESPONSE TEXT:    This patient has sepsis which was present on admission.    Query created by: Katie Oliveros on 2024 11:12 AM      Electronically signed by:  Michelle Denney DO 2024 1:53 PM          
4 Eyes Skin Assessment     NAME:  Harmeet Doyle  YOB: 1985  MEDICAL RECORD NUMBER:  [id number]    The patient is being assessed for  Admission    I agree that at least one RN has performed a thorough Head to Toe Skin Assessment on the patient. ALL assessment sites listed below have been assessed.      Areas assessed by both nurses:    Head, Face, Ears, Shoulders, Back, Chest, Arms, Elbows, Hands, Sacrum. Buttock, Coccyx, Ischium, Legs. Feet and Heels, and Under Medical Devices         Does the Patient have a Wound? Yes wound(s) were present on assessment. LDA wound assessment was Initiated and completed by RN       Troy Prevention initiated by RN: No  Wound Care Orders initiated by RN: No    Pressure Injury (Stage 3,4, Unstageable, DTI, NWPT, and Complex wounds) if present, place Wound referral order by RN under : No    New Ostomies, if present place, Ostomy referral order under : No     Nurse 1 eSignature: Electronically signed by Alona Slade RN on 1/5/24 at 6:55 PM EST    **SHARE this note so that the co-signing nurse can place an eSignature**    Nurse 2 eSignature: Electronically signed by Roxy Schneider RN on 1/5/24 at 6:56 PM EST   
[facility] - Family Medicine Inpatient   Resident Progress Note    S:  Hospital day: 4   Brief Synopsis: 38 y.o. male with a PMH of Depression/Anxiety who presented with complaints of left leg swelling and tenderness that has been present for the past two weeks. He described it as being a very tight sensation with a constant pain throughout the entire left leg with associated limited his mobility. He states, \"I also have a hard lump under my skin up on the thigh. It's been there for a minute and it's painful.\" Patient also noted that he had a recurring abscess that would drain blood/pus like discharge on his buttocks for the past few months. Of note, the patient was incarcerated for 13 years. During his incarceration he endorses having recurring abscesses mainly on his armpits and groin. Patient smokes tobacco 1 pack per day and drinks about 2-3 22-ounce cans of beers daily. Denied IV drug use. ED workup remarkable for sinus tachycardia, leukocytosis, anemia and CT demonstrated fluid collection in the left adductor. He was then started on Vancomycin and Zosyn. General surgery consulted. Drain placed per IR on 1/4/24. Had 80cc purulent material at time of procedure. ID following and transitioned antibiotics to Linezolid. Wound culture positive for strep pneumoniae. Blood cultures negative.     Overnight/interim:   No overnight events  Hemoglobin stable  Denies further episodes of hematochezia   Patient remains afebrile   States pain is better controlled today   Denies chest pain, palpitations, nausea, vomiting, abdominal pain or SOB.          Cont meds:    sodium chloride       Scheduled meds:    ferrous sulfate  325 mg Oral Q48H    acetaminophen  650 mg Oral 3 times per day    Or    acetaminophen  650 mg Rectal 3 times per day    pantoprazole  40 mg Oral BID AC    linezolid  600 mg Oral 2 times per day    sodium chloride flush  5-40 mL IntraVENous 2 times per day    polyethylene glycol  17 g Oral Daily    nicotine  1 
[facility]  Family Medicine Attending    S: 38 y.o. male with PMHx depression/anxiety presents with pain and swelling into left upper thigh near groin for about 2 weeks.  No known injury or trauma.  No breaks in the skin, no animal bites.  No history of IV drug use.  Has had \"boils\" of buttocks, axilla, and groin in the past. Was previously incarcerated.  Renal lesion noted on imaging.  Patient had concerns for melena on 1/4.  IR drain inserted on 1/4/24, draining about 80 cc purulent material at that time.    Today, s/p drain, stable, pain is improving overall.  Reports black bm in the past 24 hours. No abdominal pain.    O: VS- Blood pressure 130/86, pulse 86, temperature 97.1 °F (36.2 °C), temperature source Temporal, resp. rate 18, height 1.778 m (5' 10\"), SpO2 98 %.  Exam is as noted by resident with the following changes, additions or corrections:  Gen NAD, A&A  CV RRR, systolic murmur LSB   Resp CTAB, unlabored  Abd soft  Ext no edema; proximal left thigh drain in place with sanguninous purulent material.      Impressions:   Principal Problem:    Abscess  Active Problems:    Heart murmur  Resolved Problems:    * No resolved hospital problems. *      Plan:   General surgery input appreciated.  ID consult placed, given Vanc and Zosyn to start. And changed 1/5 to linezolid. Zoloft stopped.   S/p drain insertion on 1/4/24 with purulent material, cultures with gram + cocci in pairs and strep pneumoniae, sensitivities pending.  Prelim blood culture negative.  30 cc out yesterday    Watch H&H, decreasing H&H, appreciate general surgery eval. Trend hemoglobin. Anemia workup in progress. Suspect iron deficiency anemia.  PPI.  If h/h drops, will pursue scopes    Renal US pending for follow up of ct abnormality    Echo pending for murmur     Attending Physician Statement  I have reviewed the chart and seen the patient with the resident(s).  I personally reviewed images, EKG's and similar tests, if 
CLINICAL PHARMACY NOTE: MEDS TO BEDS    Total # of Prescriptions Filled: 5   The following medications were delivered to the patient:  Linezolid 600  Ferosul 325  Percocet 5-325  Nicotine 21    Additional Documentation:   PT pharmacy   
[facility]  Family Medicine Attending    S: 38 y.o. male with PMHx depression/anxiety presents with pain and swelling into left upper thigh near groin for about 2 weeks.  No known injury or trauma.  No breaks in the skin, no animal bites.  No history of IV drug use.  Has had \"boils\" of buttocks, axilla, and groin in the past. Was previously incarcerated.  Renal lesion noted on imaging.  Patient had concerns for melena this AM.  IR drain inserted on 1/4/24, draining about 80 cc purulent material.      Today, s/p drain, stable, pain is improving overall.  No new symptoms or concerns.  Has not seen GI bleeding recently.      O: VS- Blood pressure (!) 141/95, pulse (!) 107, temperature 98.3 °F (36.8 °C), temperature source Temporal, resp. rate 23, SpO2 96 %.  Exam is as noted by resident with the following changes, additions or corrections:  Gen NAD, A&A  CV RRR, mildly tachy, systolic murmur LSB   Resp CTAB, unlabored  Abd soft  Ext no edema; proximal left thigh drain in place with purulent material.      Impressions:   Principal Problem:    Abscess  Active Problems:    Heart murmur  Resolved Problems:    * No resolved hospital problems. *      Plan:   General surgery input appreciated.  ID consult placed, given Vanc and Zosyn to start.  S/p drain insertion on 1/4/24 with purulent material, cultures pending.  Prelim blood culture negative.      Watch H&H, decreasing H&H, may be dilutional without active bleeding known, plans for recheck.  Anemia workup in progress.  Gen surg management appreciated as well.  PPI.  Evaluate periph smear, haptoglobin, LDH.  Has a component of iron deficiency as well.       Renal US ordered.      Attending Physician Statement  I have reviewed the chart and seen the patient with the resident(s).  I personally reviewed images, EKG's and similar tests, if present.  I personally reviewed and performed key elements of the history and exam.  I have reviewed and confirmed the 
Discharge instructions (AVS) reviewed in their entirety with patient and his significant other Cheo at the bedside. Both verbalized understanding & deny any questions at this time. Hard copy of AVS given to patient. Instructed to record drain outputs and take to follow up appointments. They verbalized understanding of drain care and Cheo voiced that she feels comfortable performing care.    Anticipated Meds to Beds delivery at 4:30pm. Patient reports he does not want to wait and states he will stop at the pharmacy prior to leaving hospital. Request placed for wheelchair transport.    Patient reports has all of his belongings, including: clothing, shoes, 2 crutches, cell phone, .    Electronically signed by Nilam Watson RN on 1/9/2024 at 2:50 PM    
[facility]  Family Medicine Attending    S: 38 y.o. male with PMHx depression/anxiety presents with pain and swelling into left upper thigh near groin for about 2 weeks.  No known injury or trauma.  No breaks in the skin, no animal bites.  No history of IV drug use.  Has had \"boils\" of buttocks, axilla, and groin in the past. Was previously incarcerated.  Renal lesion noted on imaging.  Patient had concerns for melena this AM.      Today, symptoms are stable, no growth or change of the lesion.  Pain controlled overall with medication.      O: VS- Blood pressure 116/87, pulse (!) 117, temperature 98.1 °F (36.7 °C), temperature source Temporal, resp. rate 16, SpO2 97 %.  Exam is as noted by resident with the following changes, additions or corrections:  Gen NAD, A&A  CV RRR, mildly tachy  Resp CTAB, unlabored  Abd soft  Ext no edema; proximal left thigh with approx 7 cm firm cystic induration, deep to skin surface, tender.      Impressions:   Principal Problem:    Abscess  Resolved Problems:    * No resolved hospital problems. *      Plan:   General surgery input appreciated.  ID consult placed, given antibiotics to start.  Plans for IR drain tomorrow; NPO after midnight, no blood thinners.  Await cultures.  Blood, urine cultures pending.    Watch H&H.    Renal US.  U/A, culture.       Attending Physician Statement  I have reviewed the chart and seen the patient with the resident(s).  I personally reviewed images, EKG's and similar tests, if present.  I personally reviewed and performed key elements of the history and exam.  I have reviewed and confirmed the Family Medicine admitting team resident history and exam with changes as indicated above.  I agree with the assessment, plan, and orders as documented by the FM admitting team resident Fabian Ruiz, Torsten, Jed.  Please refer to the resident progress note today and admission history and physical  for additional information.      Roxy Anderson, 
GENERAL SURGERY  DAILY PROGRESS NOTE  1/4/2024    Subjective:  Paged by nursing that pt had dark bowel movement.     Pt states he had one episode and it was a maroon coloration. He denies this previously happening. Does not feel light headed or dizzy.    Objective:  /86   Pulse (!) 120   Temp 98.1 °F (36.7 °C) (Temporal)   Resp 20   SpO2 100%     Gen: alert, oriented, no apparent distress  HEENT: NCAT, anicteric  CV: RR  Pulm: nonlabored breathing on RA  Abdomen: soft, nontender, nondistended  Rectal: no external hemorrhoids noted, rectal tone intact, no palpable masses. On OMI there is dark red stool present  Extremities: atraumatic, left groin is tender to palpation as well as the upper, medial aspect of his left thigh. There is no obvious erythema   Skin: warm and dry    Assessment/Plan:  38 y.o. male with left groin intramuscular abscess     - will monitor Hb   - IR for drain placement   - NPO with IVF   - continue Abx   - ID recommendations   - follow up blood cultures and HIV    Electronically signed by Leslie Zhao DO on 1/4/2024 at 6:31 AM   
GENERAL SURGERY  DAILY PROGRESS NOTE  1/5/2024    CHIEF COMPLAINT:  Chief Complaint   Patient presents with    Leg Swelling     Left upper leg swelling starting one week ago. Difficulty walking. Sent in by PCP. Patient states it's like a cramping feeling.       SUBJECTIVE:  Had his drain placed yesterday, denies any nausea or vomiting. Having some pain. Drain with 80 ccs pus removed.     OBJECTIVE:  /74   Pulse (!) 112   Temp 98 °F (36.7 °C)   Resp 23   SpO2 98%     GENERAL:  NAD. A&Ox3.  HEENT: normocephalic   LUNGS:  on room air. No acute respiratory distress  CARDIOVASCULAR: hemodynamically stable  SKIN: warm and dry  ABDOMEN:  Soft, non-distended, nontender. No guarding, rigidity, rebound.  EXT: ROM intact all 4 extremities, no obvious deformities, drain with seropurulent output     ASSESSMENT/PLAN:  Principal Problem:    Abscess  Resolved Problems:    * No resolved hospital problems. *        38 y.o. male with left groin intramuscular abscess  -no surgical intervention needed at this time   -abx per ID   -recheck hgb this afternoon given drop from 9.4 to 8.0 this morning   -drain flushes     Grey Chopra MD  Surgery Resident PGY-3  1/5/2024  7:39 AM     
GENERAL SURGERY  DAILY PROGRESS NOTE  1/6/2024    Subjective:  Pt is resting in bed. He denies any abdominal pain, nausea/vomiting. States he did have a dark bowel movement yesterday which he states was a dark brown.    Pain is improving in left leg.    Objective:  /86   Pulse 86   Temp 97.1 °F (36.2 °C) (Temporal)   Resp 18   Ht 1.778 m (5' 10\")   SpO2 98%   BMI 30.13 kg/m²     Gen: alert, oriented, no apparent distress  HEENT: NCAT  CV: RR  Pulm: nonlabored breathing on RA  Abdomen: soft, nontender, nondistended  Extremities: moving all extremities, LONNIE drain present in left upper thigh with 25 cc seropurulent fluid present  Rectal: no external hemorrhoids present, rectal tone intact, minimal stool present in rectal vault, FOBT+  Skin: warm and dry    Assessment/Plan:  38 y.o. male with left groin intramuscular abscess and acute drop in Hb     - abx per ID   - drain flushes   - Hb 7.0 on last two draws, will consider scopes if pt continues to have melena    - continue PPI BID    Electronically signed by Leslie Zhao DO on 1/6/2024 at 7:35 AM       [facility]   ATTENDING PHYSICIAN PROGRESS NOTE      I have personally examined the patient, personally reviewed the record, and personally discussed the case with the resident. I have personally reviewed all relevant labs and imaging data. I am actively managing this patient's medications.  Please refer to the resident's note. I agree with the assessment and plan with the following corrections/additions. The following summarizes my clinical findings and independent assessment.      CC: thigh abscess     Pt without complaints.  Reports blood per rectum is less.     Awake and alert  Follows commands  Hrt:  regular  Lungs:  clear  Abd:  soft; BS active; NT/ND  Skin:  warm/dry  Ext:  IR drain in place with purulent drainage     Labs personally reviewed--Hgb 7.1         Patient Active Problem List     Diagnosis Date Noted    Hypertension 
GENERAL SURGERY  DAILY PROGRESS NOTE  1/7/2024    Subjective:  Pain with his drain. No longer having bloody bowel movements.     Objective:  /85   Pulse 98   Temp 100.1 °F (37.8 °C)   Resp 19   Ht 1.778 m (5' 10\")   SpO2 98%   BMI 30.13 kg/m²     Gen: alert, oriented, no apparent distress  HEENT: NCAT  CV: RR  Pulm: nonlabored breathing on RA  Abdomen: soft, nontender, nondistended  Extremities: moving all extremities, LONNIE drain present in left upper thigh   Skin: warm and dry    Assessment/Plan:  38 y.o. male with left groin intramuscular abscess and acute drop in Hb     - abx per ID   - drain flushes   - hgb stable; no longer having blood BMs   - continue PPI BID  -no indication for inpatient scope needs; can follow up outpatient for scopes if needed  -please call with any questions or concerns    Electronically signed by Ann Hastings DO on 1/7/2024 at 9:16 AM   
GENERAL SURGERY  DAILY PROGRESS NOTE  1/9/2024    CHIEF COMPLAINT:  Chief Complaint   Patient presents with    Leg Swelling     Left upper leg swelling starting one week ago. Difficulty walking. Sent in by PCP. Patient states it's like a cramping feeling.       SUBJECTIVE:  Resting comfortably.  States that pain and swelling is markedly improved for the past several days.    OBJECTIVE:  /69   Pulse 94   Temp 97.4 °F (36.3 °C) (Temporal)   Resp 18   Ht 1.778 m (5' 10\")   SpO2 97%   BMI 30.13 kg/m²     GENERAL:  NAD. A&Ox3.  LUNGS:  No increased work of breathing.  CARDIOVASCULAR: RR  ABDOMEN:  Soft, non-distended, non-tender. No guarding, rigidity, rebound.  EXTREMITIES: Left lower extremity, soft, nontender, drain in place with seropurulent fluid    ASSESSMENT/PLAN:  38 y.o. male with left lower extremity abscess status post IR drain 1/4    - Patient's antibiotics are being managed by ID  -To follow-up in the clinic, patient is to continue with recording output from his drain, will likely remove in the clinic  - Discharge instructions and follow-up provided  - Okay to discharge from surgical standpoint      Manolo An DO  Surgery Resident PGY-2  1/9/2024  2:44 PM   
GS AYESHA messaged to question length of LONNIE drain    Electronically signed by Holly Dietz RN on 1/9/2024 at 8:24 AM    
[facility] - Family Medicine Inpatient   Resident Progress Note    S:  Hospital day: 1   Brief Synopsis: 38 y.o. male with a PMH of Depression/Anxiety who presented with complaints of left leg swelling and tenderness that has been present for the past two weeks. He described it as being a very tight sensation with a constant pain throughout the entire left leg with associated limited his mobility. He states, \"I also have a hard lump under my skin up on the thigh. It's been there for a minute and it's painful.\" Patient also noted that he had a recurring abscess that would drain blood/pus like discharge on his buttocks for the past few months. Of note, the patient was incarcerated for 13 years. During his incarceration he endorses having recurring abscesses mainly on his armpits and groin. Patient smokes tobacco 1 pack per day and drinks about 2-3 22-ounce cans of beers daily. Denied IV drug use. ED workup remarkable for sinus tachycardia, leukocytosis, anemia and CT demonstrated fluid collection in the left adductor. He was then started on Vancomycin and Zosyn. General surgery consulted.     Overnight/interim:   Drain placed per ID on 1/4/24.   No overnight events  Hemoglobin dropped  Patient remains afebrile but tachycardic  Denies chest pain, palpitations, nausea, vomiting, abdominal pain or SOB           Cont meds:    sodium chloride       Scheduled meds:    piperacillin-tazobactam  4,500 mg IntraVENous Q6H    sertraline  50 mg Oral Daily    sodium chloride flush  5-40 mL IntraVENous 2 times per day    polyethylene glycol  17 g Oral Daily    nicotine  1 patch TransDERmal Daily    pantoprazole  40 mg Oral QAM AC    vancomycin  15 mg/kg IntraVENous Q12H     PRN meds: morphine, sodium chloride flush, sodium chloride, potassium chloride **OR** potassium alternative oral replacement **OR** potassium chloride, magnesium sulfate, ondansetron **OR** ondansetron, acetaminophen **OR** acetaminophen     I reviewed the patient's 
Infectious Diseases consult called to  per id line patient added to census.    
[facility] - Family Medicine Inpatient   Resident Progress Note    S:  Hospital day: 3   Brief Synopsis: 38 y.o. male with a PMH of Depression/Anxiety who presented with complaints of left leg swelling and tenderness that has been present for the past two weeks. He described it as being a very tight sensation with a constant pain throughout the entire left leg with associated limited his mobility. He states, \"I also have a hard lump under my skin up on the thigh. It's been there for a minute and it's painful.\" Patient also noted that he had a recurring abscess that would drain blood/pus like discharge on his buttocks for the past few months. Of note, the patient was incarcerated for 13 years. During his incarceration he endorses having recurring abscesses mainly on his armpits and groin. Patient smokes tobacco 1 pack per day and drinks about 2-3 22-ounce cans of beers daily. Denied IV drug use. ED workup remarkable for sinus tachycardia, leukocytosis, anemia and CT demonstrated fluid collection in the left adductor. He was then started on Vancomycin and Zosyn. General surgery consulted. Drain placed per IR on 1/4/24. Had 80cc purulent material at time of procedure. ID following and transitioned antibiotics to Linezolid. Wound culture positive for strep pneumoniae.     Overnight/interim:   Blood cultures negative at 48 hours   No overnight events  Hemoglobin stable  Patient remains afebrile but tachycardic  Patient admits that he closed the stopcock on his drain overnight and started to experience 8 out of 10 pain thereafter.   Denies chest pain, palpitations, nausea, vomiting, abdominal pain, hematochezia, melena or SOB.          Cont meds:    sodium chloride       Scheduled meds:    acetaminophen  650 mg Oral Q6H    Or    acetaminophen  650 mg Rectal Q6H    ferrous sulfate  325 mg Oral Q48H    pantoprazole  40 mg Oral BID AC    linezolid  600 mg Oral 2 times per day    sodium chloride flush  5-40 mL IntraVENous 2 times 
[facility]  Family Medicine Attending    S: 38 y.o. male with PMHx depression/anxiety presents with pain and swelling into left upper thigh near groin for about 2 weeks.  No known injury or trauma.  No breaks in the skin, no animal bites.  No history of IV drug use.  Has had \"boils\" of buttocks, axilla, and groin in the past. Was previously incarcerated.  Renal lesion noted on imaging.  Patient had concerns for melena on 1/4.  IR drain inserted on 1/4/24, draining about 80 cc purulent material at that time.    Today, s/p drain, stable, pain is improving overall.  No further black bowel movements. No nausea or vomiting.  No abdominal pain.    O: VS- Blood pressure 130/76, pulse 93, temperature 98.3 °F (36.8 °C), temperature source Temporal, resp. rate 18, height 1.778 m (5' 10\"), SpO2 99 %.  Exam is as noted by resident with the following changes, additions or corrections:  Gen NAD, A&A  CV RRR, systolic murmur LSB   Resp CTAB, unlabored  Abd soft  Ext no edema; proximal left thigh drain in place with sanguninous purulent material.      Impressions:   Principal Problem:    Abscess  Active Problems:    Heart murmur  Resolved Problems:    * No resolved hospital problems. *      Plan:   General surgery input appreciated.  ID consult placed, given Vanc and Zosyn to start. And changed 1/5 to linezolid.   S/p drain insertion on 1/4/24 with purulent material, cultures with gram + cocci in pairs and strep pneumoniae, sensitivities pending.  Prelim blood culture negative.  50 cc out yesterday, 65 cc out so far today.     Discharged pending home health care.     Watch H&H, appreciate general surgery eval.  H&H has been stable x 2 days. Will need outpatient follow up with gen surg and heme/onc.     Renal US wnl     Echo done with no significant abnormality.      Attending Physician Statement  I have reviewed the chart and seen the patient with the resident(s).  I personally reviewed images, EKG's and similar 
Message sent to Marilyn Sarmiento Chi, MD via perfect serve to clarify if the patient could be discharged. Unable to set up HHC until Thursday. Case Management is aware and is working to adjust plans.  
Message sent to general surgery regarding outpatient LONNIE drain management.   
Pharmacy Consultation Note  (Antibiotic Dosing and Monitoring)    Initial consult date: 1/4/2024  Consulting physician/provider: Susannah Adkins DO   Drug: Vancomycin  Indication: Left groin abscess    Age/  Gender Height Weight IBW  Allergy Information   38 y.o./male  177.8 cm    95.3 kg   Ideal body weight: 73 kg (160 lb 15 oz)  Adjusted ideal body weight: 81.9 kg (180 lb 9 oz)   Patient has no known allergies.      Renal Function:  Recent Labs     01/03/24 2032   BUN 21*   CREATININE 0.7       Intake/Output Summary (Last 24 hours) at 1/4/2024 2023  Last data filed at 1/4/2024 1727  Gross per 24 hour   Intake 1451.99 ml   Output --   Net 1451.99 ml       Vancomycin Monitoring:  Trough:  No results for input(s): \"VANCOTROUGH\" in the last 72 hours.  Random:  No results for input(s): \"VANCORANDOM\" in the last 72 hours.    Vancomycin Administration Times:  Recent vancomycin administrations                     vancomycin (VANCOCIN) 1500 mg in sodium chloride 0.9 % 250 mL IVPB (mg) 1,500 mg New Bag 01/04/24 1844    vancomycin (VANCOCIN) 2,000 mg in sodium chloride 0.9 % 500 mL IVPB (mg) 2,000 mg New Bag 01/04/24 0454                    Assessment:  Patient is a 38 y.o. male who has been initiated on vancomycin  Estimated Creatinine Clearance: 166 mL/min (based on SCr of 0.7 mg/dL).  To dose vancomycin, pharmacy will be utilizing "[employer]" calculation software for goal AUC/MAXIM 400-600 mg/L-hr (predicted AUC/MAXIM = 420, Tr =9.7 mcg/mL)    Plan:  Will continue vancomycin 1500 mg IV every 12 hours  Will check vancomycin trough when appropriate.   Will continue to monitor renal function   Pharmacy to follow      Tootie Vivas, PharmD, BCIDP, BCPS 1/4/2024 8:24 PM  [phone]   
Physical Therapy  Physical Therapy Initial Assessment     Name: Harmeet Doyle  : 1985  MRN: [id number]      Date of Service: 2024    Evaluating PT:  Wilfrido Santa PT, DPT    Room #:  7403/7403-A  Diagnosis:  Abscess [L02.91]  Leg swelling [M79.89]  Leukocytosis, unspecified type [D72.829]  PMHx/PSHx:  depression, anxiety, tobacco abuse  Procedure/Surgery:  s/p CT guided abscess drainage and catheter placement   Precautions:  fall risk, monitor HR  Equipment Owned: B axillary crutches  Equipment Needs:  new B axillary crutches per pt request    SUBJECTIVE:    Pt lives with significant other in a 2 story home with 5 steps to enter and B handrail.  Bed/bath is on 2nd floor.  Pt ambulated with crutches PTA.    OBJECTIVE:   Initial Evaluation  Date: 24 Treatment Short Term/ Long Term   Goals   AM-PAC 6 Clicks      Was pt agreeable to Eval/treatment? yes     Does pt have pain? 6/10 L groin     Bed Mobility  Rolling: IND  Supine to sit: min A  Sit to supine: Supervision  Scooting: Supervision    Supine to sit: IND  Sit to supine: IND  Scooting: IND   Transfers Sit to stand: Supervision  Stand to sit: Supervision  Stand pivot: Supervision with crutches  Sit to stand: mod I  Stand to sit: mod I  Stand pivot: mod I with AAD   Ambulation    20'x2 with crutches Supervision  150'+ with AAD mod I   Stair negotiation: ascended and descended  NT, pt's HR elevated this date, limiting assessment  5 steps with AAD and 1 handrail mod I     Strength/ROM:   BLE grossly 4/5  BLE AROM WFL    Balance:   Static Sitting: IND  Dynamic Sitting: IND  Static Standing: Supervision with crutches  Dynamic Standing: Supervision with crutches    Pt is A & O x 3  Sensation:  Pt denies numbness and tingling to extremities  Edema:  unremarkable    Vitals:  SpO2 stable on RA, HR elevated >110 bpm with functional mobility    Therapeutic Exercises:    Bed mobility: supine<>sit, cued for EOB positioning  Transfers: STS x1, cued for hand 
[facility] - Family Medicine Inpatient   Resident Progress Note    S:  Hospital day: 5   Brief Synopsis: 38 y.o. male with a PMH of Depression/Anxiety who presented with complaints of left leg swelling and tenderness that has been present for the past two weeks. He described it as being a very tight sensation with a constant pain throughout the entire left leg with associated limited his mobility. He states, \"I also have a hard lump under my skin up on the thigh. It's been there for a minute and it's painful.\" Patient also noted that he had a recurring abscess that would drain blood/pus like discharge on his buttocks for the past few months. Of note, the patient was incarcerated for 13 years. During his incarceration he endorses having recurring abscesses mainly on his armpits and groin. Patient smokes tobacco 1 pack per day and drinks about 2-3 22-ounce cans of beers daily. Denied IV drug use. ED workup remarkable for sinus tachycardia, leukocytosis, anemia and CT demonstrated fluid collection in the left adductor. He was then started on Vancomycin and Zosyn. General surgery consulted. Drain placed per IR on 1/4/24. Had 80cc purulent material at time of procedure. ID following and transitioned antibiotics to Linezolid. Wound culture positive for strep pneumoniae. Blood cultures negative.     Overnight/interim:   No overnight events  Hemoglobin stable  Denies further episodes of hematochezia   Patient remains afebrile   Pain well controlled   Denies chest pain, palpitations, nausea, vomiting, abdominal pain or SOB.          Cont meds:    sodium chloride       Scheduled meds:    ferrous sulfate  325 mg Oral Q48H    acetaminophen  650 mg Oral 3 times per day    Or    acetaminophen  650 mg Rectal 3 times per day    pantoprazole  40 mg Oral BID AC    linezolid  600 mg Oral 2 times per day    sodium chloride flush  5-40 mL IntraVENous 2 times per day    polyethylene glycol  17 g Oral Daily    nicotine  1 patch TransDERmal 
Spoke to IR to question length of drain necessity. They deferred decision to GS, notified Dr An of request    Electronically signed by Holly Dietz RN on 1/9/2024 at 9:28 AM    
Spoke with RN regarding patient's ordered drain insertion. Please keep patient NPO after midnight, and hold all thinners for procedure. Patient is able to sign consent.   
[facility]  Family Medicine Attending    S: 38 y.o. male with PMHx depression/anxiety presents with pain and swelling into left upper thigh near groin for about 2 weeks.  No known injury or trauma.  No breaks in the skin, no animal bites.  No history of IV drug use.  Has had \"boils\" of buttocks, axilla, and groin in the past. Was previously incarcerated.  Renal lesion noted on imaging.  Patient had concerns for melena on 1/4.  IR drain inserted on 1/4/24, draining about 80 cc purulent material at that time.    Today, s/p drain, stable, pain is improving overall.  No further black bowel movements. No nausea or vomiting.  No abdominal pain. Feeling well and ready to go home.     O: VS- Blood pressure 123/64, pulse 89, temperature 98.7 °F (37.1 °C), temperature source Temporal, resp. rate 18, height 1.778 m (5' 10\"), SpO2 98 %.  Exam is as noted by resident with the following changes, additions or corrections:  Gen NAD, A&A  CV RRR, systolic murmur LSB   Resp CTAB, unlabored  Abd soft  Ext no edema; proximal left thigh drain in place with sanguninous purulent material.      Impressions:   Principal Problem:    Abscess  Active Problems:    Heart murmur  Resolved Problems:    * No resolved hospital problems. *      Plan:   General surgery input appreciated.  ID consult placed, given Vanc and Zosyn to start. And changed 1/5 to linezolid.   S/p drain insertion on 1/4/24 with purulent material, cultures with gram + cocci in pairs and strep pneumoniae, sensitivities pending.  Prelim blood culture negative.  65 cc out yesterday. Patient reports his sister can empty drain until home health care can establish care on 1/11. Meds to beds for linezolid , currently on day 5/14.       Watch H&H, appreciate general surgery eval.  H&H has been stable x 3 days and is currently improving. . Will need outpatient follow up with gen surg and heme/onc.     Renal US wnl     Echo done with no significant abnormality.      
[facility]  Family Medicine Attending    S: 38 y.o. male with PMHx depression/anxiety presents with pain and swelling into left upper thigh near groin for about 2 weeks.  No known injury or trauma.  No breaks in the skin, no animal bites.  No history of IV drug use.  Has had \"boils\" of buttocks, axilla, and groin in the past. Was previously incarcerated.  Renal lesion noted on imaging.  Patient had concerns for melena on 1/4.  IR drain inserted on 1/4/24, draining about 80 cc purulent material at that time.    Today, s/p drain, stable, has had some pain in the area of the drain but he admits he had placed the stopcock closed.  Over the past 24 hours reports almost the whole bulb was filled with drainage. No abdominal pain or further black bms.    O: VS- Blood pressure 131/85, pulse 98, temperature 100.1 °F (37.8 °C), resp. rate 19, height 1.778 m (5' 10\"), SpO2 98 %.  Exam is as noted by resident with the following changes, additions or corrections:  Gen NAD, A&A  CV RRR, systolic murmur LSB   Resp CTAB, unlabored  Abd soft  Ext no edema; proximal left thigh drain in place with sanguninous purulent material.      Impressions:   Principal Problem:    Abscess  Active Problems:    Heart murmur  Resolved Problems:    * No resolved hospital problems. *      Plan:   General surgery input appreciated.  ID consult placed, given Vanc and Zosyn to start. And changed 1/5 to linezolid. Zoloft stopped.   S/p drain insertion on 1/4/24 with purulent material, cultures with gram + cocci in pairs and strep pneumoniae, sensitivities noted, will verify if med changes to occur with ID.  Prelim blood culture negative.  50 cc out yesterday. Drain care.    Watch H&H, decreasing H&H, appreciate general surgery eval. Trend of hemoglobin has been stable. Iron supplement started for iron deficiency anemia.  PPI.  If h/h drops, will pursue scopes    Renal US neg    Echo ok.     Disposition planning to home     Attending 
Vancomycin has been discontinued   Clinical Pharmacy to sign-off  Physician to re-consult pharmacy if future dosing is needed    Thank you for the consult,  Isabella RileyD, BCPS 1/5/2024 2:55 PM    
        Electronically signed by Susannah Adkins DO on 1/6/2024 at 6:02 AM  Attending physician: Dr. Diaz   
signed by Medical Student ADRIENNE Krishna on 1/8/2024 at 11:39 AM  This case was discussed with attending physician: Dr. Oliver

## 2024-01-09 NOTE — PLAN OF CARE
Problem: Discharge Planning  Goal: Discharge to home or other facility with appropriate resources  1/9/2024 0039 by Becky Tran, RN  Outcome: Progressing     Problem: Safety - Adult  Goal: Free from fall injury  1/9/2024 0039 by Becky Tran, RN  Outcome: Progressing     Problem: Pain  Goal: Verbalizes/displays adequate comfort level or baseline comfort level  1/9/2024 0039 by Becky Tran, RN  Outcome: Progressing

## 2024-01-09 NOTE — CARE COORDINATION
1/9/24 Update CM Note. Pt admitted 1/4/23 for abscess with drain placed to left thigh/groin.  CT guided abscess drained  with drain placement.   Planning for DC to home with [facility], SOC date 1/7/24.  Per Resident pt ok to discharge home as long as education for drain care provided.  Sig other received education per bedside RN.  States she feels comfortable managing drain.  Christel from [facility] notified DC today and sig other instructed to call [facility] # provided on AVS for any issues.  [facility] will start tomorrow if schedule allows.   Sig other to transport home.   Mirta FRIEDN RN-BC  [phone]

## 2024-01-12 ENCOUNTER — OFFICE VISIT (OUTPATIENT)
Dept: FAMILY MEDICINE CLINIC | Age: 39
End: 2024-01-12
Payer: COMMERCIAL

## 2024-01-12 VITALS
TEMPERATURE: 97.8 F | BODY MASS INDEX: 25.9 KG/M2 | OXYGEN SATURATION: 98 % | HEIGHT: 71 IN | WEIGHT: 185 LBS | SYSTOLIC BLOOD PRESSURE: 132 MMHG | HEART RATE: 125 BPM | DIASTOLIC BLOOD PRESSURE: 84 MMHG

## 2024-01-12 DIAGNOSIS — D75.839 THROMBOCYTOSIS: ICD-10-CM

## 2024-01-12 DIAGNOSIS — Z09 HOSPITAL DISCHARGE FOLLOW-UP: Primary | ICD-10-CM

## 2024-01-12 DIAGNOSIS — D50.9 IRON DEFICIENCY ANEMIA, UNSPECIFIED IRON DEFICIENCY ANEMIA TYPE: ICD-10-CM

## 2024-01-12 LAB
ABSOLUTE IMMATURE GRANULOCYTE: 0.07 K/UL (ref 0–0.58)
BASOPHILS ABSOLUTE: 0.11 K/UL (ref 0–0.2)
BASOPHILS RELATIVE PERCENT: 1 % (ref 0–2)
EOSINOPHILS ABSOLUTE: 0.15 K/UL (ref 0.05–0.5)
EOSINOPHILS RELATIVE PERCENT: 1 % (ref 0–6)
HCT VFR BLD CALC: 26.2 % (ref 37–54)
HEMOGLOBIN: 8.2 G/DL (ref 12.5–16.5)
IMMATURE GRANULOCYTES: 1 % (ref 0–5)
LYMPHOCYTES ABSOLUTE: 2.34 K/UL (ref 1.5–4)
LYMPHOCYTES RELATIVE PERCENT: 17 % (ref 20–42)
MCH RBC QN AUTO: 25.3 PG (ref 26–35)
MCHC RBC AUTO-ENTMCNC: 31.3 G/DL (ref 32–34.5)
MCV RBC AUTO: 80.9 FL (ref 80–99.9)
MONOCYTES ABSOLUTE: 1.05 K/UL (ref 0.1–0.95)
MONOCYTES RELATIVE PERCENT: 8 % (ref 2–12)
NEUTROPHILS ABSOLUTE: 10.27 K/UL (ref 1.8–7.3)
NEUTROPHILS RELATIVE PERCENT: 73 % (ref 43–80)
PDW BLD-RTO: 16.4 % (ref 11.5–15)
PLATELET, FLUORESCENCE: 1267 K/UL (ref 130–450)
PMV BLD AUTO: 8.2 FL (ref 7–12)
RBC # BLD: 3.24 M/UL (ref 3.8–5.8)
WBC # BLD: 14 K/UL (ref 4.5–11.5)

## 2024-01-12 PROCEDURE — 3079F DIAST BP 80-89 MM HG: CPT | Performed by: STUDENT IN AN ORGANIZED HEALTH CARE EDUCATION/TRAINING PROGRAM

## 2024-01-12 PROCEDURE — 1111F DSCHRG MED/CURRENT MED MERGE: CPT | Performed by: STUDENT IN AN ORGANIZED HEALTH CARE EDUCATION/TRAINING PROGRAM

## 2024-01-12 PROCEDURE — 99214 OFFICE O/P EST MOD 30 MIN: CPT | Performed by: STUDENT IN AN ORGANIZED HEALTH CARE EDUCATION/TRAINING PROGRAM

## 2024-01-12 PROCEDURE — 3075F SYST BP GE 130 - 139MM HG: CPT | Performed by: STUDENT IN AN ORGANIZED HEALTH CARE EDUCATION/TRAINING PROGRAM

## 2024-01-12 NOTE — PROGRESS NOTES
S: 38 y.o. male here for hospital follow up of left groin abscess. He had a drain placed by IR and general surgery saw him.  Strep pneumonie grew on culture. ID prescribed zyvox. He has been taking percocet.   Clear drainage 5ml daily  Mild lower pelvic pain.     O: VS: /84   Pulse (!) 125   Temp 97.8 °F (36.6 °C) (Temporal)   Ht 1.803 m (5' 11\")   Wt 83.9 kg (185 lb)   SpO2 98%   BMI 25.80 kg/m²    General: NAD   CV:  RRR, no gallops, rubs, or murmurs   Resp: CTAB no R/R/W   Abd:  Soft, nontender, no masses    Ext:  no C/C/E   Left groin-mild ttp infraumbilical   Drain inplace with a dressing  Impression/Plan:   1. Left groin abscess-continue zyvox for total of 14 days. Naproxen  2.Microcytic anemia-cbc, gen surgery  scope  3. Back pain/cyst-refill robaxin, follow up with nsg  4. Thrombocytosis-h/o referral    Rto in 4 weeks      Attending Physician Statement  I have discussed the case, including pertinent history and exam findings with the resident. I have seen the patient.   I agree with the documented assessment and plan.        Lainey Diaz MD        
which is seeing neurosurg next week.       Patient Active Problem List   Diagnosis    Hypertension    Abscess    Heart murmur       Medications listed as ordered at the time of discharge from hospital     Medication List            Accurate as of January 12, 2024 10:41 AM. If you have any questions, ask your nurse or doctor.                CONTINUE taking these medications      ferrous sulfate 325 (65 Fe) MG tablet  Commonly known as: IRON 325  Take 1 tablet by mouth every 48 hours     linezolid 600 MG tablet  Commonly known as: Zyvox  Take 1 tablet by mouth 2 times daily for 9 days     pantoprazole 40 MG tablet  Commonly known as: PROTONIX  Take 1 tablet by mouth 2 times daily (before meals)            STOP taking these medications      oxyCODONE-acetaminophen 5-325 MG per tablet  Commonly known as: PERCOCET  Stopped by: Rivka Carrillo MD                Medications marked \"taking\" at this time  No outpatient medications have been marked as taking for the 1/12/24 encounter (Office Visit) with Rivka Carrillo MD.        Medications patient taking as of now reconciled against medications ordered at time of hospital discharge: Yes    A comprehensive review of systems was negative except for what was noted in the HPI.    Objective:    /84   Pulse (!) 125   Temp 97.8 °F (36.6 °C) (Temporal)   Ht 1.803 m (5' 11\")   Wt 83.9 kg (185 lb)   SpO2 98%   BMI 25.80 kg/m²   General Appearance: alert and oriented to person, place and time, well developed and well- nourished, in no acute distress  Skin: warm and dry, no rash or erythema  Head: normocephalic and atraumatic  Eyes: pupils equal, round, and reactive to light, extraocular eye movements intact, conjunctivae normal  ENT: external ear normal bilaterally  Neck: supple and non-tender without mass, no thyromegaly or thyroid nodules, no cervical lymphadenopathy  Pulmonary/Chest: clear to auscultation bilaterally- no wheezes, rales or rhonchi, normal air movement, no

## 2024-01-17 ENCOUNTER — OFFICE VISIT (OUTPATIENT)
Dept: SURGERY | Age: 39
End: 2024-01-17
Payer: COMMERCIAL

## 2024-01-17 VITALS
DIASTOLIC BLOOD PRESSURE: 101 MMHG | OXYGEN SATURATION: 98 % | WEIGHT: 189 LBS | HEART RATE: 126 BPM | SYSTOLIC BLOOD PRESSURE: 141 MMHG | BODY MASS INDEX: 26.46 KG/M2 | HEIGHT: 71 IN | RESPIRATION RATE: 16 BRPM

## 2024-01-17 DIAGNOSIS — L02.419 LEG ABSCESS: Primary | ICD-10-CM

## 2024-01-17 PROCEDURE — 99202 OFFICE O/P NEW SF 15 MIN: CPT | Performed by: STUDENT IN AN ORGANIZED HEALTH CARE EDUCATION/TRAINING PROGRAM

## 2024-01-17 NOTE — PROGRESS NOTES
Subjective:       Harmeet Doyle presents to the clinic for evaluation of an left thigh drain which had been placed by IR after presenting with a Left adductor intramuscular abscess.  Cultures showed Strp pneumoniae and he was treated with linazolid by the medicine service.  Today he states he is feeling well and pain free.  He denies fever, chills, or difficulty with ambulation.  Eating a regular diet without difficulty.      Objective:      There were no vitals taken for this visit.    General:  alert, appears stated age, and cooperative   Left thigh: Soft, no swelling, non-tender to palpation, no erythema    Drain site:   Secured and patent, minimally cloudy serous fluid in bulb    CT 1/3 1. There are fluid collections in the left adductor musculature measuring up  to 7.6 x 4.4 x 5.8 cm.  These are concerning for abscess however hematoma is  an additional consideration.  Also, cannot exclude an underlying neoplasm    IR drain placement 1/3 IMPRESSION:  1. Successful CT guided pigtail drainage catheter placement.  2. Catheter and bag should be flushed with 10 mL sterile normal saline TID  while in the hospital and once a day after discharge.  3. Suggest catheter removal once daily output is less than 10 mL and imaging  confirming resolution.    Micro   Specimen Description .BODY FLUID   Direct Exam Gram stain performed on unspun fluid.    MANY Polymorphonuclear leukocytes    NO EPITHELIAL CELLS    FEW GRAM POSITIVE COCCI IN PAIRS Abnormal    Culture STREPTOCOCCUS PNEUMONIAE Identification by MALDI-TOF LIGHT GROWTH Abnormal        Assessment:      Doing well post-procedure.  Drain output <10cc/day and non-purulent output.  Scheduled for CT scan tomorrow.  If CT shows resolution of the fluid collection, then the patient will be instructed to come to clinic for drain removal.      Plan:      1. Continue any current medications.  2. Drain care discussed.  3. Pending Ct to assess for drain removal

## 2024-01-18 ENCOUNTER — TELEPHONE (OUTPATIENT)
Dept: GENERAL RADIOLOGY | Age: 39
End: 2024-01-18

## 2024-01-18 ENCOUNTER — HOSPITAL ENCOUNTER (OUTPATIENT)
Dept: CT IMAGING | Age: 39
Discharge: HOME | End: 2024-01-20
Attending: STUDENT IN AN ORGANIZED HEALTH CARE EDUCATION/TRAINING PROGRAM
Payer: COMMERCIAL

## 2024-01-18 DIAGNOSIS — L02.419 LEG ABSCESS: ICD-10-CM

## 2024-01-18 PROCEDURE — 73701 CT LOWER EXTREMITY W/DYE: CPT

## 2024-01-18 PROCEDURE — 6360000004 HC RX CONTRAST MEDICATION: Performed by: RADIOLOGY

## 2024-01-18 RX ORDER — SODIUM CHLORIDE 0.9 % (FLUSH) 0.9 %
10 SYRINGE (ML) INJECTION
Status: ACTIVE | OUTPATIENT
Start: 2024-01-18 | End: 2024-01-19

## 2024-01-18 RX ADMIN — IOPAMIDOL 75 ML: 755 INJECTION, SOLUTION INTRAVENOUS at 10:19

## 2024-01-18 NOTE — TELEPHONE ENCOUNTER
1/18/2024  CALLED PT TO SCHEDULED CT ABSCESS DRAIN FU.  PT STATES HE HAD A CT SCAN TODAY AND THAT HE RECEIVED A CALL FROM SURGEON, DR SEIPPLE AND THAT HE WILL REMOVE THE DRAIN ON 1/19/24 AT 1PM.

## 2024-01-19 ENCOUNTER — OFFICE VISIT (OUTPATIENT)
Dept: SURGERY | Age: 39
End: 2024-01-19
Payer: COMMERCIAL

## 2024-01-19 VITALS — BODY MASS INDEX: 26.46 KG/M2 | RESPIRATION RATE: 16 BRPM | WEIGHT: 189 LBS | HEIGHT: 71 IN

## 2024-01-19 DIAGNOSIS — L02.91 ABSCESS: Primary | ICD-10-CM

## 2024-01-19 PROCEDURE — 99212 OFFICE O/P EST SF 10 MIN: CPT | Performed by: STUDENT IN AN ORGANIZED HEALTH CARE EDUCATION/TRAINING PROGRAM

## 2024-01-19 NOTE — PROGRESS NOTES
Brief Note    Patient seen and examined in the clinic today after his CT scan yesterday.  CT shoed resolution of the left thigh fluid collection.  Drain was removed intact in clinic today by myself.  Patient was instructed to contact the clinic for bleeding, excessive drainage, increasing pain/swelling/erythema of his leg, of fever/chills.  Patient was able to ambulate without issues after the drain was removed.      Silverio Zheng MD

## 2024-02-16 ENCOUNTER — TELEPHONE (OUTPATIENT)
Dept: FAMILY MEDICINE CLINIC | Age: 39
End: 2024-02-16

## 2024-02-16 NOTE — TELEPHONE ENCOUNTER
Pt called to check on the status of his [place] papers.  Pt states job is pressuring him to get them turned in.  Please advise   Normal/Making sense

## 2024-03-08 ENCOUNTER — OFFICE VISIT (OUTPATIENT)
Dept: FAMILY MEDICINE CLINIC | Age: 39
End: 2024-03-08
Payer: COMMERCIAL

## 2024-03-08 VITALS
WEIGHT: 196.4 LBS | SYSTOLIC BLOOD PRESSURE: 140 MMHG | HEART RATE: 99 BPM | DIASTOLIC BLOOD PRESSURE: 102 MMHG | TEMPERATURE: 98.3 F | BODY MASS INDEX: 27.39 KG/M2 | RESPIRATION RATE: 18 BRPM

## 2024-03-08 DIAGNOSIS — I10 PRIMARY HYPERTENSION: Primary | ICD-10-CM

## 2024-03-08 PROCEDURE — 99213 OFFICE O/P EST LOW 20 MIN: CPT | Performed by: STUDENT IN AN ORGANIZED HEALTH CARE EDUCATION/TRAINING PROGRAM

## 2024-03-08 PROCEDURE — G8417 CALC BMI ABV UP PARAM F/U: HCPCS | Performed by: STUDENT IN AN ORGANIZED HEALTH CARE EDUCATION/TRAINING PROGRAM

## 2024-03-08 PROCEDURE — G8427 DOCREV CUR MEDS BY ELIG CLIN: HCPCS | Performed by: STUDENT IN AN ORGANIZED HEALTH CARE EDUCATION/TRAINING PROGRAM

## 2024-03-08 PROCEDURE — 4004F PT TOBACCO SCREEN RCVD TLK: CPT | Performed by: STUDENT IN AN ORGANIZED HEALTH CARE EDUCATION/TRAINING PROGRAM

## 2024-03-08 PROCEDURE — 3080F DIAST BP >= 90 MM HG: CPT | Performed by: STUDENT IN AN ORGANIZED HEALTH CARE EDUCATION/TRAINING PROGRAM

## 2024-03-08 PROCEDURE — 3077F SYST BP >= 140 MM HG: CPT | Performed by: STUDENT IN AN ORGANIZED HEALTH CARE EDUCATION/TRAINING PROGRAM

## 2024-03-08 PROCEDURE — G8484 FLU IMMUNIZE NO ADMIN: HCPCS | Performed by: STUDENT IN AN ORGANIZED HEALTH CARE EDUCATION/TRAINING PROGRAM

## 2024-03-08 RX ORDER — HYDROCHLOROTHIAZIDE 25 MG/1
25 TABLET ORAL EVERY MORNING
Qty: 90 TABLET | Refills: 0 | Status: SHIPPED | OUTPATIENT
Start: 2024-03-08

## 2024-03-08 RX ORDER — BLOOD PRESSURE TEST KIT
KIT MISCELLANEOUS
Qty: 1 KIT | Refills: 0 | Status: SHIPPED | OUTPATIENT
Start: 2024-03-08

## 2024-03-08 NOTE — PROGRESS NOTES
Patient is a 38-year-old male here for hypertension follow-up.  Not currently taking any medications.  Was previously on hydrochlorothiazide but has not been on it since he was in correction.  He had 1 episode where he thought he had chest pain but has not had any since.  Denies shortness of breath, dyspnea on exertion, nausea, vomiting, dizziness/lightheadedness.  Occasionally will have palpitations.  Does not check his blood pressure at home.    Blood pressure (!) 140/102, pulse 99, temperature 98.3 °F (36.8 °C), temperature source Temporal, resp. rate 18, weight 89.1 kg (196 lb 6.4 oz).     HEENT WNL     Heart regular    Lungs clear    abd non-tender      No edema    Pulses intact      Impression:  Hypertension    Start HCTZ 25 mg.  Patient wanted to start 25 instead of 12.5 because he feels he needs the higher dose.  Discussed checking his blood pressure at home to make sure is not dropping too low and staying well-hydrated on this medication.    Attending Physician Statement  I have discussed the case, including pertinent history and exam findings with the resident. I agree with the documented assessment and plan.

## 2024-03-08 NOTE — PROGRESS NOTES
[facility]  FAMILY MEDICINE RESIDENCY PROGRAM  DATE OF VISIT : 3/8/2024    Patient : Harmeet Doyle   Age : 38 y.o.    : 1985   MRN : [id number]   ______________________________________________________________________    Chief Complaint :   Chief Complaint   Patient presents with    Hypertension       HPI : Harmeet Doyle is 38 y.o. male who presented to the clinic today for HTN follow up . Not currently taking any medications. Uncontrolled in office. Was previously on hydrochlorothiazide but has not been on it since he was in [facility]. He had 1 episode where he thought he had chest pain but has not had any since. Denies shortness of breath, dyspnea on exertion, nausea, vomiting, dizziness/lightheadedness. Occasionally will have palpitations. Does not check his blood pressure at home.           Patient's medications, allergies, past medical, surgical, social and family histories were reviewed and updated as appropriate.    Past Medical History :  Past Medical History:   Diagnosis Date    High blood pressure      Past Surgical History:   Procedure Laterality Date    SPINAL FUSION      pedicle screw and chantelle fusion for scoliosis as a child       Allergies :   No Known Allergies    Medication List :    Current Outpatient Medications   Medication Sig Dispense Refill    hydroCHLOROthiazide (HYDRODIURIL) 25 MG tablet Take 1 tablet by mouth every morning 90 tablet 0    Blood Pressure KIT Check your blood pressure every day 1 kit 0     No current facility-administered medications for this visit.        ______________________________________________________________________    Physical Exam :    Vitals: BP (!) 140/102 (Site: Left Upper Arm, Position: Sitting, Cuff Size: Large Adult)   Pulse 99   Temp 98.3 °F (36.8 °C) (Temporal)   Resp 18   Wt 89.1 kg (196 lb 6.4 oz)   BMI 27.39 kg/m²   General Appearance: Well developed, awake, alert, oriented, and in NAD  HEENT: NCAT, MMM, no pallor or icterus.   Neck:

## 2024-03-12 ENCOUNTER — HOSPITAL ENCOUNTER (EMERGENCY)
Age: 39
Discharge: LEFT BEFORE DISCHARGE | End: 2024-03-12

## 2024-03-12 VITALS — HEART RATE: 114 BPM | OXYGEN SATURATION: 98 % | TEMPERATURE: 97.6 F

## 2024-11-07 ENCOUNTER — HOSPITAL ENCOUNTER (OUTPATIENT)
Age: 39
Setting detail: OBSERVATION
Discharge: HOME | End: 2024-11-09
Attending: EMERGENCY MEDICINE | Admitting: FAMILY MEDICINE
Payer: COMMERCIAL

## 2024-11-07 DIAGNOSIS — K85.90 ACUTE PANCREATITIS, UNSPECIFIED COMPLICATION STATUS, UNSPECIFIED PANCREATITIS TYPE: Primary | ICD-10-CM

## 2024-11-07 LAB
ALBUMIN SERPL-MCNC: 4 G/DL (ref 3.5–5.2)
ALP SERPL-CCNC: 103 U/L (ref 40–129)
ALT SERPL-CCNC: 29 U/L (ref 0–40)
ANION GAP SERPL CALCULATED.3IONS-SCNC: 14 MMOL/L (ref 7–16)
AST SERPL-CCNC: 38 U/L (ref 0–39)
BASOPHILS # BLD: 0.03 K/UL (ref 0–0.2)
BASOPHILS NFR BLD: 0 % (ref 0–2)
BILIRUB SERPL-MCNC: 0.7 MG/DL (ref 0–1.2)
BILIRUB UR QL STRIP: NEGATIVE
BUN SERPL-MCNC: 11 MG/DL (ref 6–20)
CALCIUM SERPL-MCNC: 9.2 MG/DL (ref 8.6–10.2)
CHLORIDE SERPL-SCNC: 89 MMOL/L (ref 98–107)
CLARITY UR: CLEAR
CO2 SERPL-SCNC: 24 MMOL/L (ref 22–29)
COLOR UR: YELLOW
CREAT SERPL-MCNC: 1 MG/DL (ref 0.7–1.2)
EOSINOPHIL # BLD: 0.06 K/UL (ref 0.05–0.5)
EOSINOPHILS RELATIVE PERCENT: 1 % (ref 0–6)
EPI CELLS #/AREA URNS HPF: ABNORMAL /HPF
ERYTHROCYTE [DISTWIDTH] IN BLOOD BY AUTOMATED COUNT: 15.8 % (ref 11.5–15)
GFR, ESTIMATED: >90 ML/MIN/1.73M2
GLUCOSE SERPL-MCNC: 115 MG/DL (ref 74–99)
GLUCOSE UR STRIP-MCNC: NEGATIVE MG/DL
HCT VFR BLD AUTO: 44.8 % (ref 37–54)
HGB BLD-MCNC: 14.5 G/DL (ref 12.5–16.5)
HGB UR QL STRIP.AUTO: NEGATIVE
IMM GRANULOCYTES # BLD AUTO: 0.06 K/UL (ref 0–0.58)
IMM GRANULOCYTES NFR BLD: 1 % (ref 0–5)
KETONES UR STRIP-MCNC: ABNORMAL MG/DL
LEUKOCYTE ESTERASE UR QL STRIP: NEGATIVE
LIPASE SERPL-CCNC: 45 U/L (ref 13–60)
LYMPHOCYTES NFR BLD: 1.71 K/UL (ref 1.5–4)
LYMPHOCYTES RELATIVE PERCENT: 14 % (ref 20–42)
MCH RBC QN AUTO: 25.4 PG (ref 26–35)
MCHC RBC AUTO-ENTMCNC: 32.4 G/DL (ref 32–34.5)
MCV RBC AUTO: 78.5 FL (ref 80–99.9)
MONOCYTES NFR BLD: 1.04 K/UL (ref 0.1–0.95)
MONOCYTES NFR BLD: 8 % (ref 2–12)
MUCOUS THREADS URNS QL MICRO: PRESENT
NEUTROPHILS NFR BLD: 77 % (ref 43–80)
NEUTS SEG NFR BLD: 9.5 K/UL (ref 1.8–7.3)
NITRITE UR QL STRIP: NEGATIVE
PH UR STRIP: 6 [PH] (ref 5–9)
PLATELET # BLD AUTO: 198 K/UL (ref 130–450)
PMV BLD AUTO: 10 FL (ref 7–12)
POTASSIUM SERPL-SCNC: 3.7 MMOL/L (ref 3.5–5)
PROT SERPL-MCNC: 8.3 G/DL (ref 6.4–8.3)
PROT UR STRIP-MCNC: ABNORMAL MG/DL
RBC # BLD AUTO: 5.71 M/UL (ref 3.8–5.8)
RBC #/AREA URNS HPF: ABNORMAL /HPF
SODIUM SERPL-SCNC: 127 MMOL/L (ref 132–146)
SP GR UR STRIP: 1.02 (ref 1–1.03)
TROPONIN I SERPL HS-MCNC: 7 NG/L (ref 0–11)
UROBILINOGEN UR STRIP-ACNC: 0.2 EU/DL (ref 0–1)
WBC #/AREA URNS HPF: ABNORMAL /HPF
WBC OTHER # BLD: 12.4 K/UL (ref 4.5–11.5)

## 2024-11-07 PROCEDURE — 96374 THER/PROPH/DIAG INJ IV PUSH: CPT

## 2024-11-07 PROCEDURE — 6360000002 HC RX W HCPCS

## 2024-11-07 PROCEDURE — 80053 COMPREHEN METABOLIC PANEL: CPT

## 2024-11-07 PROCEDURE — 85025 COMPLETE CBC W/AUTO DIFF WBC: CPT

## 2024-11-07 PROCEDURE — 99285 EMERGENCY DEPT VISIT HI MDM: CPT

## 2024-11-07 PROCEDURE — 96361 HYDRATE IV INFUSION ADD-ON: CPT

## 2024-11-07 PROCEDURE — 2580000003 HC RX 258

## 2024-11-07 PROCEDURE — 83690 ASSAY OF LIPASE: CPT

## 2024-11-07 PROCEDURE — 84484 ASSAY OF TROPONIN QUANT: CPT

## 2024-11-07 PROCEDURE — 93005 ELECTROCARDIOGRAM TRACING: CPT

## 2024-11-07 PROCEDURE — 81001 URINALYSIS AUTO W/SCOPE: CPT

## 2024-11-07 RX ORDER — 0.9 % SODIUM CHLORIDE 0.9 %
1000 INTRAVENOUS SOLUTION INTRAVENOUS ONCE
Status: COMPLETED | OUTPATIENT
Start: 2024-11-07 | End: 2024-11-08

## 2024-11-07 RX ORDER — KETOROLAC TROMETHAMINE 30 MG/ML
15 INJECTION, SOLUTION INTRAMUSCULAR; INTRAVENOUS ONCE
Status: COMPLETED | OUTPATIENT
Start: 2024-11-07 | End: 2024-11-07

## 2024-11-07 RX ADMIN — KETOROLAC TROMETHAMINE 15 MG: 30 INJECTION, SOLUTION INTRAMUSCULAR at 22:51

## 2024-11-07 RX ADMIN — SODIUM CHLORIDE 1000 ML: 9 INJECTION, SOLUTION INTRAVENOUS at 22:52

## 2024-11-07 ASSESSMENT — PAIN - FUNCTIONAL ASSESSMENT
PAIN_FUNCTIONAL_ASSESSMENT: ACTIVITIES ARE NOT PREVENTED
PAIN_FUNCTIONAL_ASSESSMENT: 0-10

## 2024-11-07 ASSESSMENT — PAIN SCALES - GENERAL: PAINLEVEL_OUTOF10: 7

## 2024-11-07 ASSESSMENT — PAIN DESCRIPTION - FREQUENCY: FREQUENCY: CONTINUOUS

## 2024-11-07 ASSESSMENT — PAIN DESCRIPTION - PAIN TYPE: TYPE: ACUTE PAIN

## 2024-11-07 ASSESSMENT — PAIN DESCRIPTION - LOCATION: LOCATION: ABDOMEN

## 2024-11-07 ASSESSMENT — PAIN DESCRIPTION - DESCRIPTORS: DESCRIPTORS: CRAMPING;ACHING

## 2024-11-08 ENCOUNTER — APPOINTMENT (OUTPATIENT)
Dept: CT IMAGING | Age: 39
End: 2024-11-08
Payer: COMMERCIAL

## 2024-11-08 PROBLEM — K85.90 ACUTE PANCREATITIS, UNSPECIFIED COMPLICATION STATUS, UNSPECIFIED PANCREATITIS TYPE: Status: ACTIVE | Noted: 2024-11-08

## 2024-11-08 LAB
ALBUMIN SERPL-MCNC: 3.8 G/DL (ref 3.5–5.2)
ALP SERPL-CCNC: 81 U/L (ref 40–129)
ALT SERPL-CCNC: 28 U/L (ref 0–40)
ANION GAP SERPL CALCULATED.3IONS-SCNC: 11 MMOL/L (ref 7–16)
ANION GAP SERPL CALCULATED.3IONS-SCNC: 15 MMOL/L (ref 7–16)
APAP SERPL-MCNC: <5 UG/ML (ref 10–30)
AST SERPL-CCNC: 37 U/L (ref 0–39)
BASOPHILS # BLD: 0.03 K/UL (ref 0–0.2)
BASOPHILS NFR BLD: 0 % (ref 0–2)
BILIRUB SERPL-MCNC: 0.7 MG/DL (ref 0–1.2)
BUN SERPL-MCNC: 10 MG/DL (ref 6–20)
BUN SERPL-MCNC: 11 MG/DL (ref 6–20)
CALCIUM SERPL-MCNC: 8.6 MG/DL (ref 8.6–10.2)
CALCIUM SERPL-MCNC: 9.4 MG/DL (ref 8.6–10.2)
CHLORIDE SERPL-SCNC: 94 MMOL/L (ref 98–107)
CHLORIDE SERPL-SCNC: 96 MMOL/L (ref 98–107)
CHOLEST SERPL-MCNC: 163 MG/DL
CO2 SERPL-SCNC: 23 MMOL/L (ref 22–29)
CO2 SERPL-SCNC: 24 MMOL/L (ref 22–29)
CREAT SERPL-MCNC: 0.8 MG/DL (ref 0.7–1.2)
CREAT SERPL-MCNC: 0.8 MG/DL (ref 0.7–1.2)
EKG ATRIAL RATE: 124 BPM
EKG P AXIS: 63 DEGREES
EKG P-R INTERVAL: 152 MS
EKG Q-T INTERVAL: 294 MS
EKG QRS DURATION: 84 MS
EKG QTC CALCULATION (BAZETT): 422 MS
EKG R AXIS: 49 DEGREES
EKG T AXIS: 11 DEGREES
EKG VENTRICULAR RATE: 124 BPM
EOSINOPHIL # BLD: 0.08 K/UL (ref 0.05–0.5)
EOSINOPHILS RELATIVE PERCENT: 1 % (ref 0–6)
ERYTHROCYTE [DISTWIDTH] IN BLOOD BY AUTOMATED COUNT: 15.3 % (ref 11.5–15)
ETHANOLAMINE SERPL-MCNC: <10 MG/DL (ref 0–0.08)
FOLATE SERPL-MCNC: >20 NG/ML (ref 4.8–24.2)
GFR, ESTIMATED: >90 ML/MIN/1.73M2
GFR, ESTIMATED: >90 ML/MIN/1.73M2
GLUCOSE SERPL-MCNC: 105 MG/DL (ref 74–99)
GLUCOSE SERPL-MCNC: 131 MG/DL (ref 74–99)
HBA1C MFR BLD: 5.6 % (ref 4–5.6)
HCT VFR BLD AUTO: 40.4 % (ref 37–54)
HDLC SERPL-MCNC: 54 MG/DL
HGB BLD-MCNC: 13 G/DL (ref 12.5–16.5)
IMM GRANULOCYTES # BLD AUTO: 0.05 K/UL (ref 0–0.58)
IMM GRANULOCYTES NFR BLD: 1 % (ref 0–5)
INR PPP: 0.9
LDH SERPL-CCNC: 243 U/L (ref 135–225)
LDLC SERPL CALC-MCNC: 87 MG/DL
LYMPHOCYTES NFR BLD: 1.67 K/UL (ref 1.5–4)
LYMPHOCYTES RELATIVE PERCENT: 19 % (ref 20–42)
MAGNESIUM SERPL-MCNC: 2.6 MG/DL (ref 1.6–2.6)
MCH RBC QN AUTO: 25.3 PG (ref 26–35)
MCHC RBC AUTO-ENTMCNC: 32.2 G/DL (ref 32–34.5)
MCV RBC AUTO: 78.6 FL (ref 80–99.9)
MONOCYTES NFR BLD: 0.87 K/UL (ref 0.1–0.95)
MONOCYTES NFR BLD: 10 % (ref 2–12)
NEUTROPHILS NFR BLD: 70 % (ref 43–80)
NEUTS SEG NFR BLD: 6.17 K/UL (ref 1.8–7.3)
OSMOLALITY SERPL: 277 MOSM/KG (ref 285–310)
PARTIAL THROMBOPLASTIN TIME: 29.9 SEC (ref 24.5–35.1)
PHOSPHATE SERPL-MCNC: 4 MG/DL (ref 2.5–4.5)
PLATELET # BLD AUTO: 206 K/UL (ref 130–450)
PMV BLD AUTO: 9.8 FL (ref 7–12)
POTASSIUM SERPL-SCNC: 3.1 MMOL/L (ref 3.5–5)
POTASSIUM SERPL-SCNC: 3.5 MMOL/L (ref 3.5–5)
PROT SERPL-MCNC: 7.4 G/DL (ref 6.4–8.3)
PROTHROMBIN TIME: 10 SEC (ref 9.3–12.4)
RBC # BLD AUTO: 5.14 M/UL (ref 3.8–5.8)
SALICYLATES SERPL-MCNC: <0.3 MG/DL (ref 0–30)
SODIUM SERPL-SCNC: 128 MMOL/L (ref 132–146)
SODIUM SERPL-SCNC: 135 MMOL/L (ref 132–146)
TOXIC TRICYCLIC SC,BLOOD: NEGATIVE
TRIGL SERPL-MCNC: 109 MG/DL
VIT B12 SERPL-MCNC: 985 PG/ML (ref 211–946)
VLDLC SERPL CALC-MCNC: 22 MG/DL
WBC OTHER # BLD: 8.9 K/UL (ref 4.5–11.5)

## 2024-11-08 PROCEDURE — 99222 1ST HOSP IP/OBS MODERATE 55: CPT | Performed by: FAMILY MEDICINE

## 2024-11-08 PROCEDURE — 74177 CT ABD & PELVIS W/CONTRAST: CPT

## 2024-11-08 PROCEDURE — 85730 THROMBOPLASTIN TIME PARTIAL: CPT

## 2024-11-08 PROCEDURE — 80061 LIPID PANEL: CPT

## 2024-11-08 PROCEDURE — G0378 HOSPITAL OBSERVATION PER HR: HCPCS

## 2024-11-08 PROCEDURE — 84425 ASSAY OF VITAMIN B-1: CPT

## 2024-11-08 PROCEDURE — 80307 DRUG TEST PRSMV CHEM ANLYZR: CPT

## 2024-11-08 PROCEDURE — 6370000000 HC RX 637 (ALT 250 FOR IP): Performed by: EMERGENCY MEDICINE

## 2024-11-08 PROCEDURE — 80143 DRUG ASSAY ACETAMINOPHEN: CPT

## 2024-11-08 PROCEDURE — 93010 ELECTROCARDIOGRAM REPORT: CPT | Performed by: INTERNAL MEDICINE

## 2024-11-08 PROCEDURE — 85610 PROTHROMBIN TIME: CPT

## 2024-11-08 PROCEDURE — 6370000000 HC RX 637 (ALT 250 FOR IP)

## 2024-11-08 PROCEDURE — 83615 LACTATE (LD) (LDH) ENZYME: CPT

## 2024-11-08 PROCEDURE — 83735 ASSAY OF MAGNESIUM: CPT

## 2024-11-08 PROCEDURE — 80179 DRUG ASSAY SALICYLATE: CPT

## 2024-11-08 PROCEDURE — 82607 VITAMIN B-12: CPT

## 2024-11-08 PROCEDURE — 96375 TX/PRO/DX INJ NEW DRUG ADDON: CPT

## 2024-11-08 PROCEDURE — 83036 HEMOGLOBIN GLYCOSYLATED A1C: CPT

## 2024-11-08 PROCEDURE — 6360000002 HC RX W HCPCS

## 2024-11-08 PROCEDURE — 2580000003 HC RX 258

## 2024-11-08 PROCEDURE — G0480 DRUG TEST DEF 1-7 CLASSES: HCPCS

## 2024-11-08 PROCEDURE — 6360000004 HC RX CONTRAST MEDICATION: Performed by: RADIOLOGY

## 2024-11-08 PROCEDURE — 84100 ASSAY OF PHOSPHORUS: CPT

## 2024-11-08 PROCEDURE — 85025 COMPLETE CBC W/AUTO DIFF WBC: CPT

## 2024-11-08 PROCEDURE — 96361 HYDRATE IV INFUSION ADD-ON: CPT

## 2024-11-08 PROCEDURE — 83930 ASSAY OF BLOOD OSMOLALITY: CPT

## 2024-11-08 PROCEDURE — 36415 COLL VENOUS BLD VENIPUNCTURE: CPT

## 2024-11-08 PROCEDURE — 80053 COMPREHEN METABOLIC PANEL: CPT

## 2024-11-08 PROCEDURE — 82746 ASSAY OF FOLIC ACID SERUM: CPT

## 2024-11-08 PROCEDURE — 80048 BASIC METABOLIC PNL TOTAL CA: CPT

## 2024-11-08 RX ORDER — LORAZEPAM 2 MG/ML
2 INJECTION INTRAMUSCULAR
Status: DISCONTINUED | OUTPATIENT
Start: 2024-11-08 | End: 2024-11-09 | Stop reason: HOSPADM

## 2024-11-08 RX ORDER — SODIUM CHLORIDE 0.9 % (FLUSH) 0.9 %
5-40 SYRINGE (ML) INJECTION EVERY 12 HOURS SCHEDULED
Status: DISCONTINUED | OUTPATIENT
Start: 2024-11-08 | End: 2024-11-09 | Stop reason: HOSPADM

## 2024-11-08 RX ORDER — LANOLIN ALCOHOL/MO/W.PET/CERES
100 CREAM (GRAM) TOPICAL DAILY
Status: DISCONTINUED | OUTPATIENT
Start: 2024-11-08 | End: 2024-11-09 | Stop reason: HOSPADM

## 2024-11-08 RX ORDER — NICOTINE 21 MG/24HR
1 PATCH, TRANSDERMAL 24 HOURS TRANSDERMAL DAILY
Status: DISCONTINUED | OUTPATIENT
Start: 2024-11-08 | End: 2024-11-09 | Stop reason: HOSPADM

## 2024-11-08 RX ORDER — THIAMINE HYDROCHLORIDE 100 MG/ML
100 INJECTION, SOLUTION INTRAMUSCULAR; INTRAVENOUS ONCE
Status: COMPLETED | OUTPATIENT
Start: 2024-11-08 | End: 2024-11-08

## 2024-11-08 RX ORDER — ENOXAPARIN SODIUM 100 MG/ML
40 INJECTION SUBCUTANEOUS DAILY
Status: DISCONTINUED | OUTPATIENT
Start: 2024-11-08 | End: 2024-11-09 | Stop reason: HOSPADM

## 2024-11-08 RX ORDER — BISACODYL 5 MG/1
10 TABLET, DELAYED RELEASE ORAL DAILY
Status: DISCONTINUED | OUTPATIENT
Start: 2024-11-08 | End: 2024-11-08

## 2024-11-08 RX ORDER — ONDANSETRON 2 MG/ML
4 INJECTION INTRAMUSCULAR; INTRAVENOUS EVERY 6 HOURS PRN
Status: DISCONTINUED | OUTPATIENT
Start: 2024-11-08 | End: 2024-11-09 | Stop reason: HOSPADM

## 2024-11-08 RX ORDER — LORAZEPAM 1 MG/1
3 TABLET ORAL
Status: DISCONTINUED | OUTPATIENT
Start: 2024-11-08 | End: 2024-11-09 | Stop reason: HOSPADM

## 2024-11-08 RX ORDER — SODIUM CHLORIDE 9 MG/ML
INJECTION, SOLUTION INTRAVENOUS PRN
Status: DISCONTINUED | OUTPATIENT
Start: 2024-11-08 | End: 2024-11-09 | Stop reason: HOSPADM

## 2024-11-08 RX ORDER — IOPAMIDOL 755 MG/ML
75 INJECTION, SOLUTION INTRAVASCULAR
Status: COMPLETED | OUTPATIENT
Start: 2024-11-08 | End: 2024-11-08

## 2024-11-08 RX ORDER — LORAZEPAM 1 MG/1
2 TABLET ORAL
Status: DISCONTINUED | OUTPATIENT
Start: 2024-11-08 | End: 2024-11-09 | Stop reason: HOSPADM

## 2024-11-08 RX ORDER — ACETAMINOPHEN 650 MG/1
650 SUPPOSITORY RECTAL EVERY 6 HOURS PRN
Status: DISCONTINUED | OUTPATIENT
Start: 2024-11-08 | End: 2024-11-09 | Stop reason: HOSPADM

## 2024-11-08 RX ORDER — THIAMINE HYDROCHLORIDE 100 MG/ML
100 INJECTION, SOLUTION INTRAMUSCULAR; INTRAVENOUS DAILY
Status: DISCONTINUED | OUTPATIENT
Start: 2024-11-08 | End: 2024-11-08

## 2024-11-08 RX ORDER — POLYETHYLENE GLYCOL 3350 17 G/17G
17 POWDER, FOR SOLUTION ORAL DAILY
Status: DISCONTINUED | OUTPATIENT
Start: 2024-11-08 | End: 2024-11-08

## 2024-11-08 RX ORDER — BISACODYL 5 MG/1
10 TABLET, DELAYED RELEASE ORAL DAILY
Status: DISCONTINUED | OUTPATIENT
Start: 2024-11-08 | End: 2024-11-09 | Stop reason: HOSPADM

## 2024-11-08 RX ORDER — LORAZEPAM 1 MG/1
1 TABLET ORAL
Status: DISCONTINUED | OUTPATIENT
Start: 2024-11-08 | End: 2024-11-09 | Stop reason: HOSPADM

## 2024-11-08 RX ORDER — POLYETHYLENE GLYCOL 3350 17 G/17G
17 POWDER, FOR SOLUTION ORAL 2 TIMES DAILY
Status: DISCONTINUED | OUTPATIENT
Start: 2024-11-08 | End: 2024-11-09 | Stop reason: HOSPADM

## 2024-11-08 RX ORDER — LORAZEPAM 1 MG/1
4 TABLET ORAL
Status: DISCONTINUED | OUTPATIENT
Start: 2024-11-08 | End: 2024-11-09 | Stop reason: HOSPADM

## 2024-11-08 RX ORDER — LABETALOL HYDROCHLORIDE 5 MG/ML
10 INJECTION, SOLUTION INTRAVENOUS EVERY 4 HOURS PRN
Status: DISCONTINUED | OUTPATIENT
Start: 2024-11-08 | End: 2024-11-09 | Stop reason: HOSPADM

## 2024-11-08 RX ORDER — ACETAMINOPHEN 325 MG/1
650 TABLET ORAL EVERY 6 HOURS PRN
Status: DISCONTINUED | OUTPATIENT
Start: 2024-11-08 | End: 2024-11-09 | Stop reason: HOSPADM

## 2024-11-08 RX ORDER — FOLIC ACID 1 MG/1
1 TABLET ORAL DAILY
Status: DISCONTINUED | OUTPATIENT
Start: 2024-11-08 | End: 2024-11-09 | Stop reason: HOSPADM

## 2024-11-08 RX ORDER — HYDROCHLOROTHIAZIDE 25 MG/1
25 TABLET ORAL DAILY
Status: DISCONTINUED | OUTPATIENT
Start: 2024-11-08 | End: 2024-11-09 | Stop reason: HOSPADM

## 2024-11-08 RX ORDER — LANOLIN ALCOHOL/MO/W.PET/CERES
100 CREAM (GRAM) TOPICAL DAILY
Status: DISCONTINUED | OUTPATIENT
Start: 2024-11-08 | End: 2024-11-08

## 2024-11-08 RX ORDER — POTASSIUM CHLORIDE 1500 MG/1
20 TABLET, EXTENDED RELEASE ORAL DAILY
Status: DISCONTINUED | OUTPATIENT
Start: 2024-11-08 | End: 2024-11-09 | Stop reason: HOSPADM

## 2024-11-08 RX ORDER — AMLODIPINE BESYLATE 5 MG/1
5 TABLET ORAL DAILY
Status: DISCONTINUED | OUTPATIENT
Start: 2024-11-08 | End: 2024-11-09

## 2024-11-08 RX ORDER — LORAZEPAM 2 MG/ML
4 INJECTION INTRAMUSCULAR
Status: DISCONTINUED | OUTPATIENT
Start: 2024-11-08 | End: 2024-11-09 | Stop reason: HOSPADM

## 2024-11-08 RX ORDER — ONDANSETRON 4 MG/1
4 TABLET, ORALLY DISINTEGRATING ORAL EVERY 8 HOURS PRN
Status: DISCONTINUED | OUTPATIENT
Start: 2024-11-08 | End: 2024-11-09 | Stop reason: HOSPADM

## 2024-11-08 RX ORDER — LORAZEPAM 2 MG/ML
1 INJECTION INTRAMUSCULAR
Status: DISCONTINUED | OUTPATIENT
Start: 2024-11-08 | End: 2024-11-09 | Stop reason: HOSPADM

## 2024-11-08 RX ORDER — LORAZEPAM 2 MG/ML
3 INJECTION INTRAMUSCULAR
Status: DISCONTINUED | OUTPATIENT
Start: 2024-11-08 | End: 2024-11-09 | Stop reason: HOSPADM

## 2024-11-08 RX ORDER — POTASSIUM CHLORIDE 1500 MG/1
40 TABLET, EXTENDED RELEASE ORAL DAILY
Status: DISCONTINUED | OUTPATIENT
Start: 2024-11-08 | End: 2024-11-09 | Stop reason: HOSPADM

## 2024-11-08 RX ORDER — SODIUM CHLORIDE 0.9 % (FLUSH) 0.9 %
5-40 SYRINGE (ML) INJECTION PRN
Status: DISCONTINUED | OUTPATIENT
Start: 2024-11-08 | End: 2024-11-09 | Stop reason: HOSPADM

## 2024-11-08 RX ADMIN — HYDROCHLOROTHIAZIDE 25 MG: 25 TABLET ORAL at 03:10

## 2024-11-08 RX ADMIN — POTASSIUM CHLORIDE 40 MEQ: 1500 TABLET, EXTENDED RELEASE ORAL at 10:10

## 2024-11-08 RX ADMIN — SODIUM CHLORIDE, PRESERVATIVE FREE 10 ML: 5 INJECTION INTRAVENOUS at 21:00

## 2024-11-08 RX ADMIN — BISACODYL 10 MG: 5 TABLET, COATED ORAL at 04:36

## 2024-11-08 RX ADMIN — THIAMINE HYDROCHLORIDE 100 MG: 100 INJECTION, SOLUTION INTRAMUSCULAR; INTRAVENOUS at 04:36

## 2024-11-08 RX ADMIN — POTASSIUM CHLORIDE 20 MEQ: 1500 TABLET, EXTENDED RELEASE ORAL at 10:09

## 2024-11-08 RX ADMIN — Medication 100 MG: at 10:09

## 2024-11-08 RX ADMIN — POLYETHYLENE GLYCOL 3350 17 G: 17 POWDER, FOR SOLUTION ORAL at 04:36

## 2024-11-08 RX ADMIN — SODIUM CHLORIDE, PRESERVATIVE FREE 10 ML: 5 INJECTION INTRAVENOUS at 10:09

## 2024-11-08 RX ADMIN — AMLODIPINE BESYLATE 5 MG: 5 TABLET ORAL at 10:09

## 2024-11-08 RX ADMIN — FOLIC ACID 1 MG: 1 TABLET ORAL at 04:36

## 2024-11-08 RX ADMIN — IOPAMIDOL 75 ML: 755 INJECTION, SOLUTION INTRAVENOUS at 00:15

## 2024-11-08 ASSESSMENT — PAIN SCALES - GENERAL
PAINLEVEL_OUTOF10: 4
PAINLEVEL_OUTOF10: 2
PAINLEVEL_OUTOF10: 3

## 2024-11-08 ASSESSMENT — PAIN DESCRIPTION - FREQUENCY: FREQUENCY: INTERMITTENT

## 2024-11-08 ASSESSMENT — PAIN DESCRIPTION - ONSET: ONSET: ON-GOING

## 2024-11-08 ASSESSMENT — PAIN DESCRIPTION - DESCRIPTORS: DESCRIPTORS: ACHING;CRAMPING;DISCOMFORT

## 2024-11-08 ASSESSMENT — PAIN SCALES - WONG BAKER: WONGBAKER_NUMERICALRESPONSE: HURTS A LITTLE BIT

## 2024-11-08 ASSESSMENT — PAIN DESCRIPTION - PAIN TYPE: TYPE: ACUTE PAIN

## 2024-11-08 ASSESSMENT — PAIN - FUNCTIONAL ASSESSMENT: PAIN_FUNCTIONAL_ASSESSMENT: ACTIVITIES ARE NOT PREVENTED

## 2024-11-08 ASSESSMENT — PAIN DESCRIPTION - ORIENTATION: ORIENTATION: MID

## 2024-11-08 ASSESSMENT — PAIN DESCRIPTION - LOCATION: LOCATION: ABDOMEN

## 2024-11-08 NOTE — H&P
[facility] - Family Medicine Resident Inpatient  History and Physical      CC: Abdominal Pain (Abdominal pain/ constipation x 5 days has used OTC meds with no effect denies chest pain sob n/v/d)  .    HPI: History obtained from patient. Patient is oriented to time, place, person x3  Harmeet Doyle is a 39 y.o. male with a PMH of HTN who presents to ED for constipation for 5 days.  Patient states that he had been having constipation for the past 5 days leading to abdominal distention and discomfort.  He has tried OTC medications with no relief.  Stated that he had a very small bowel movement yesterday as well as today.  He denied having any associated abdominal pain, N/V, urinary symptoms, SOB, chest pain, palpitations, back pain, fevers, chills, or any other acute associated symptoms.  Of note, patient has a significant alcohol history.  States that he drinks 4-5 beers (24 ounce) as well as 1 L of liquor daily that he splits with someone else.  Last drink was 6 to 7 pm prior to admission.  He has been smoking a pack of cigarettes daily since 13 years old.  He denies any drug use.  He would like to remain full code.      He is unsure about his home BP medications.  States that he has not taken any of his BP medications for the past 2 months since he lost them.    ED Course:   The patient was tachycardic and hypertensive  Labs: Mild leukocytosis (WBC 12.4), lipase WNL, and hyponatremia (127)  Imaging: CTAP with peripancreatic mesenteric stranding and fluid  EKG: Sinus tachycardia.  No acute ST-T wave changes when compared to previous EKG.  Normal QTc.  Patient was given NS bolus x 1, Toradol 15 mg x 1.  One-time dose of his home HCTZ 25 mg was ordered and administered.    When evaluated in the ED, patient denied having any abdominal pain.  He was admitted for Pancreatitis      PMH:  has a past medical history of High blood pressure.    PSH:  has a past surgical history that includes Spinal fusion.    FH: family history  while inpatient      PT/OT Evaluation: Not indicated  Antibiotics: Not indicated  BR: Scheduled Glycolax and Dulcolax   DVT Prophylaxis: Lovenox  GI Prophylaxis: Not indicated  Diet: Adult, CLD  Code Status: Full code  Dispo: Admitted     Electronically signed by Awa Seay MD on 11/8/24 at 3:44 AM EST  This case was discussed with attending physician: Dr. Romero

## 2024-11-08 NOTE — PROGRESS NOTES
[facility] - Family [facility] Inpatient   Resident Progress Note    S:  Hospital day: 0   Brief Synopsis: Harmeet Doyle is a 39 y.o. male with a PMH of HTN presented to the ED with constipation for 5 days along with abdominal distention and discomfort. OTC medications did not help. No other associated symptoms. Patient does drink about 4-5 beers (24 ounce) as well as 1 L of liquor daily that he splits with someone else. He is also a smoker, smoking a pack a day since he was 13 years old. Denies any drug use. He denied having any N/V, urinary symptoms, SOB, chest pain, palpitations, back pain, fevers, chills, or any other acute associated symptoms.     Overnight/interim:   No acute overnight events  Patient resting comfortably  He is hypertensive   No abdominal pain at rest but some pain in the LLQ upon palpation  Patient states this is day 5 without a BM  Patient did not need Ativan overnight   Denies any CP, NVD, shortness of breath, fever or chills       Cont meds:    sodium chloride       Scheduled meds:    hydroCHLOROthiazide  25 mg Oral Daily    sodium chloride flush  5-40 mL IntraVENous 2 times per day    enoxaparin  40 mg SubCUTAneous Daily    thiamine  100 mg Oral Daily    folic acid  1 mg Oral Daily    bisacodyl  10 mg Oral Daily    polyethylene glycol  17 g Oral Daily    nicotine  1 patch TransDERmal Daily    potassium chloride  40 mEq Oral Daily    And    potassium chloride  20 mEq Oral Daily     PRN meds: sodium chloride flush, sodium chloride, ondansetron **OR** ondansetron, acetaminophen **OR** acetaminophen, LORazepam **OR** LORazepam **OR** LORazepam **OR** LORazepam **OR** LORazepam **OR** LORazepam **OR** LORazepam **OR** LORazepam     I reviewed the patient's past medical and surgical history, Medications and Allergies.    O:  BP (!) 150/102   Pulse (!) 102   Temp 98.1 °F (36.7 °C) (Oral)   Resp 20   Ht 1.803 m (5' 11\")   Wt 86.2 kg (190 lb)   SpO2 100%   BMI 26.50 kg/m²   24 hour I&O: No

## 2024-11-08 NOTE — ED PROVIDER NOTES
[facility] EMERGENCY DEPARTMENT  EMERGENCY DEPARTMENT ENCOUNTER        Pt Name: Harmeet Doyle  MRN: [id number]  Birthdate 1985  Date of evaluation: 11/7/2024  Provider: Guido Lakhani MD  Attending Provider: Jose Romero MD  PCP: Rivka Carrillo MD  Note Started: 10:36 PM EST 11/7/24    CHIEF COMPLAINT       Chief Complaint   Patient presents with    Abdominal Pain     Abdominal pain/ constipation x 5 days has used OTC meds with no effect denies chest pain sob n/v/d       HISTORY OF PRESENT ILLNESS: 1 or more Elements   History From: The patient        Harmeet Doyle is a 39 y.o. male with a past medical history of hypertension presenting with abdominal pain and constipation.  Patient states that he has been having increasing abdominal pain, distention, and constipation worsening over the last 5 days.  He states that he has not been able to have a significant bowel movement.  He has had 2 very small bowel movements in the last 5 days.  He states that he has been having difficulty passing gas as well it was worse in the first 3 days but improved the last 2.  No urinary symptoms.  Patient states that he does not typically have issues with constipation but the last time he had issues was when he had a back surgery and was hospitalized around that time.  He does not feel that this is similar as he is not having any urinary symptoms or difficulty walking or numbness or weakness or loss of sensation.  No reported fevers.  Patient denies any history of IV drug abuse.  Current smoker, occasional alcohol user, no drug use.  No chest pain or shortness of breath.  No nausea or vomiting reported.  Patient denies any prior history of abdominal surgery and has never had a colonoscopy.    Nursing Notes were all reviewed and agreed with or any disagreements were addressed in the HPI.      REVIEW OF EXTERNAL NOTE :       Family medicine office visit on 3/8/2024 noted, patient is  leukocytosis of 12,400.  CMP shows sodium 127, chloride of 99, blood glucose 115.  Troponin is 7.  Lipase is 45.  Urinalysis not suggestive of infection.  CT ab pelvis shows peripancreatic stranding and fluid concerning for pancreatitis.  No evidence of bowel obstruction or inflammation.  No obstructive uropathy.  Spinal correction hardware is in place.    On reassessment, patient was explained the results of his blood work and his imaging.  Vital signs are improving.  On reexamination, patient does have some epigastric tenderness and discomfort.  He does report daily alcohol use of 3-4 beers daily.  He labs drink alcohol at approximately 1800.  He states he has never been in withdrawal.    Decision was made to admit the patient for pancreatitis.  Case was discussed with Dr. Romero, [facility], who accepts patient for admission.  At the time of admission, the patient was hemodynamically stable.    CONSULTS: (Who and What was discussed)  IP CONSULT TO FAMILY MEDICINE        I am the Primary Clinician of Record.    FINAL IMPRESSION      1. Acute pancreatitis, unspecified complication status, unspecified pancreatitis type          DISPOSITION/PLAN     DISPOSITION Admitted 11/08/2024 02:12:24 AM      PATIENT REFERRED TO:  No follow-up provider specified.    DISCHARGE MEDICATIONS:  New Prescriptions    No medications on file       DISCONTINUED MEDICATIONS:  Discontinued Medications    No medications on file              Patient was seen and evaluated by myself and my attending Jose Romero MD. Assessment and Plan discussed with attending provider, please see attestation for final plan of care.     (Please note that portions of this note were completed with a voice recognition program.  Efforts were made to edit the dictations but occasionally words are mis-transcribed.)    Guido Lakhani MD (electronically signed)

## 2024-11-08 NOTE — PROGRESS NOTES
Pt refused Lovenox sq am dose. Education provided. Pt states, \"he gets out to the toilet frequently\".

## 2024-11-08 NOTE — ED NOTES
Spoke with  about pt blood pressure being 158/114. This nurse stated that pt reported that he stopped taking it for a few months because his son lost his medication and he thought he would be fine without it. Verbal order for Hydrodiuril 25mg placed

## 2024-11-08 NOTE — CARE COORDINATION
11/8. Met with the pt at the bedside to discuss transition of care. The pt lives alone and is independent. He was admitted to the hospital with abdominal pain and constipation. CT revealed mesenteric stranding and edema around pancreas. Discharge plan is for the pt to return home when medically stable. His car is in the hospital parking lot. Virginia Escalante RN    Case Management Assessment  Initial Evaluation    Date/Time of Evaluation: 11/8/2024 10:49 AM  Assessment Completed by: Virginia Escalante RN    If patient is discharged prior to next notation, then this note serves as note for discharge by case management.    Patient Name: Harmeet Doyle                   YOB: 1985  Diagnosis: Acute pancreatitis, unspecified complication status, unspecified pancreatitis type [K85.90]                   Date / Time: 11/7/2024 10:29 PM    Patient Admission Status: Observation   Readmission Risk (Low < 19, Mod (19-27), High > 27): Readmission Risk Score: 6.6    Current PCP: Rivka Carrillo MD  PCP verified by ? Yes (Dr Carrillo)    Chart Reviewed: Yes      History Provided by:    Patient Orientation: Alert and Oriented    Patient Cognition: Alert    Hospitalization in the last 30 days (Readmission):  No    If yes, Readmission Assessment in  Navigator will be completed.    Advance Directives:      Code Status: Full Code   Patient's Primary Decision Maker is: Legal Next of Kin      Discharge Planning:    Patient lives with: Alone Type of Home: House  Primary Care Giver: Self  Patient Support Systems include: Family Members, Children   Current Financial resources:    Current community resources:    Current services prior to admission: None            Current DME:              Type of Home Care services:  None    ADLS  Prior functional level: Independent in ADLs/IADLs  Current functional level: Independent in ADLs/IADLs    PT AM-PAC:   /24  OT AM-PAC:   /24    Family can provide assistance at DC: No  Would you like  Case Management to discuss the discharge plan with any other family members/significant others, and if so, who? No  Plans to Return to Present Housing: Yes  Other Identified Issues/Barriers to RETURNING to current housing: na  Potential Assistance needed at discharge: N/A            Potential DME:    Patient expects to discharge to: House  Plan for transportation at discharge:      Financial    Payor: [employer] / Plan: [employer] [place] MEDICAID / Product Type: *No Product type* /     Does insurance require precert for SNF: Yes    Potential assistance Purchasing Medications:    Meds-to-Beds request:        [id number] - [facility] [address] [place] [address] -  [phone] - F [phone]  [address] [place] [id number]  Phone: [phone] Fax: [phone]      Notes:    Factors facilitating achievement of predicted outcomes: Family support, Motivated, Cooperative, and Pleasant    Barriers to discharge: Pain    Additional Case Management Notes: see above    The Plan for Transition of Care is related to the following treatment goals of Acute pancreatitis, unspecified complication status, unspecified pancreatitis type [K85.90]    IF APPLICABLE: The Patient and/or patient representative Harmeet and his family were provided with a choice of provider and agrees with the discharge plan. Freedom of choice list with basic dialogue that supports the patient's individualized plan of care/goals and shares the quality data associated with the providers was provided to:     Patient Representative Name:       The Patient and/or Patient Representative Agree with the Discharge Plan?      Virginia Escalante RN  Case Management Department  Ph: [phone]      Soft, non-tender, no hepatosplenomegaly, normal bowel sounds negative

## 2024-11-08 NOTE — PROGRESS NOTES
4 Eyes Skin Assessment     NAME:  Harmeet Doyle  YOB: 1985  MEDICAL RECORD NUMBER:  [id number]    The patient is being assessed for  Admission    I agree that at least one RN has performed a thorough Head to Toe Skin Assessment on the patient. ALL assessment sites listed below have been assessed.      Areas assessed by both nurses:    Head, Face, Ears, Shoulders, Back, Chest, Arms, Elbows, Hands, Sacrum. Buttock, Coccyx, Ischium, and Legs. Feet and Heels        Does the Patient have a Wound? No noted wound(s)       Troy Prevention initiated by RN: No  Wound Care Orders initiated by RN: No    Pressure Injury (Stage 3,4, Unstageable, DTI, NWPT, and Complex wounds) if present, place Wound referral order by RN under : No    New Ostomies, if present place, Ostomy referral order under : No     Nurse 1 eSignature: Electronically signed by Saida Almeida RN on 11/8/24 at 4:10 AM EST    **SHARE this note so that the co-signing nurse can place an eSignature**    Nurse 2 eSignature: {Esignature:[id number]}

## 2024-11-09 VITALS
TEMPERATURE: 97.4 F | RESPIRATION RATE: 20 BRPM | HEIGHT: 71 IN | BODY MASS INDEX: 26.6 KG/M2 | SYSTOLIC BLOOD PRESSURE: 141 MMHG | OXYGEN SATURATION: 100 % | DIASTOLIC BLOOD PRESSURE: 110 MMHG | WEIGHT: 190 LBS | HEART RATE: 96 BPM

## 2024-11-09 PROBLEM — K85.90 ACUTE PANCREATITIS, UNSPECIFIED COMPLICATION STATUS, UNSPECIFIED PANCREATITIS TYPE: Status: RESOLVED | Noted: 2024-11-08 | Resolved: 2024-11-09

## 2024-11-09 LAB
ALBUMIN SERPL-MCNC: 4.1 G/DL (ref 3.5–5.2)
ALP SERPL-CCNC: 80 U/L (ref 40–129)
ALT SERPL-CCNC: 34 U/L (ref 0–40)
ANION GAP SERPL CALCULATED.3IONS-SCNC: 12 MMOL/L (ref 7–16)
AST SERPL-CCNC: 58 U/L (ref 0–39)
BASOPHILS # BLD: 0.02 K/UL (ref 0–0.2)
BASOPHILS NFR BLD: 0 % (ref 0–2)
BILIRUB SERPL-MCNC: 0.7 MG/DL (ref 0–1.2)
BUN SERPL-MCNC: 9 MG/DL (ref 6–20)
CALCIUM SERPL-MCNC: 9.5 MG/DL (ref 8.6–10.2)
CHLORIDE SERPL-SCNC: 92 MMOL/L (ref 98–107)
CO2 SERPL-SCNC: 25 MMOL/L (ref 22–29)
CREAT SERPL-MCNC: 0.9 MG/DL (ref 0.7–1.2)
EOSINOPHIL # BLD: 0.06 K/UL (ref 0.05–0.5)
EOSINOPHILS RELATIVE PERCENT: 1 % (ref 0–6)
ERYTHROCYTE [DISTWIDTH] IN BLOOD BY AUTOMATED COUNT: 15.4 % (ref 11.5–15)
GFR, ESTIMATED: >90 ML/MIN/1.73M2
GLUCOSE SERPL-MCNC: 102 MG/DL (ref 74–99)
HCT VFR BLD AUTO: 45 % (ref 37–54)
HGB BLD-MCNC: 14.2 G/DL (ref 12.5–16.5)
IMM GRANULOCYTES # BLD AUTO: 0.03 K/UL (ref 0–0.58)
IMM GRANULOCYTES NFR BLD: 0 % (ref 0–5)
LYMPHOCYTES NFR BLD: 1.39 K/UL (ref 1.5–4)
LYMPHOCYTES RELATIVE PERCENT: 17 % (ref 20–42)
MCH RBC QN AUTO: 25.2 PG (ref 26–35)
MCHC RBC AUTO-ENTMCNC: 31.6 G/DL (ref 32–34.5)
MCV RBC AUTO: 79.9 FL (ref 80–99.9)
MONOCYTES NFR BLD: 1.03 K/UL (ref 0.1–0.95)
MONOCYTES NFR BLD: 12 % (ref 2–12)
NEUTROPHILS NFR BLD: 70 % (ref 43–80)
NEUTS SEG NFR BLD: 5.88 K/UL (ref 1.8–7.3)
PLATELET # BLD AUTO: 234 K/UL (ref 130–450)
PMV BLD AUTO: 9.8 FL (ref 7–12)
POTASSIUM SERPL-SCNC: 5.2 MMOL/L (ref 3.5–5)
PROT SERPL-MCNC: 8.2 G/DL (ref 6.4–8.3)
RBC # BLD AUTO: 5.63 M/UL (ref 3.8–5.8)
SODIUM SERPL-SCNC: 129 MMOL/L (ref 132–146)
WBC OTHER # BLD: 8.4 K/UL (ref 4.5–11.5)

## 2024-11-09 PROCEDURE — 36415 COLL VENOUS BLD VENIPUNCTURE: CPT

## 2024-11-09 PROCEDURE — 2580000003 HC RX 258

## 2024-11-09 PROCEDURE — 6370000000 HC RX 637 (ALT 250 FOR IP)

## 2024-11-09 PROCEDURE — 99239 HOSP IP/OBS DSCHRG MGMT >30: CPT | Performed by: FAMILY MEDICINE

## 2024-11-09 PROCEDURE — 85025 COMPLETE CBC W/AUTO DIFF WBC: CPT

## 2024-11-09 PROCEDURE — G0378 HOSPITAL OBSERVATION PER HR: HCPCS

## 2024-11-09 PROCEDURE — 80053 COMPREHEN METABOLIC PANEL: CPT

## 2024-11-09 RX ORDER — LANOLIN ALCOHOL/MO/W.PET/CERES
100 CREAM (GRAM) TOPICAL DAILY
Qty: 30 TABLET | Refills: 3 | Status: SHIPPED | OUTPATIENT
Start: 2024-11-10

## 2024-11-09 RX ORDER — AMLODIPINE BESYLATE 10 MG/1
10 TABLET ORAL DAILY
Qty: 30 TABLET | Refills: 3 | Status: SHIPPED | OUTPATIENT
Start: 2024-11-10

## 2024-11-09 RX ORDER — AMLODIPINE BESYLATE 10 MG/1
10 TABLET ORAL DAILY
Status: DISCONTINUED | OUTPATIENT
Start: 2024-11-09 | End: 2024-11-09 | Stop reason: HOSPADM

## 2024-11-09 RX ORDER — FOLIC ACID 1 MG/1
1 TABLET ORAL DAILY
Qty: 30 TABLET | Refills: 3 | Status: SHIPPED | OUTPATIENT
Start: 2024-11-10

## 2024-11-09 RX ORDER — POLYETHYLENE GLYCOL 3350 17 G/17G
17 POWDER, FOR SOLUTION ORAL 2 TIMES DAILY
Qty: 527 G | Refills: 1 | Status: SHIPPED | OUTPATIENT
Start: 2024-11-09 | End: 2024-12-09

## 2024-11-09 RX ADMIN — POTASSIUM CHLORIDE 40 MEQ: 1500 TABLET, EXTENDED RELEASE ORAL at 09:10

## 2024-11-09 RX ADMIN — FOLIC ACID 1 MG: 1 TABLET ORAL at 09:09

## 2024-11-09 RX ADMIN — AMLODIPINE BESYLATE 10 MG: 10 TABLET ORAL at 09:09

## 2024-11-09 RX ADMIN — SODIUM CHLORIDE, PRESERVATIVE FREE 10 ML: 5 INJECTION INTRAVENOUS at 09:09

## 2024-11-09 RX ADMIN — POTASSIUM CHLORIDE 20 MEQ: 1500 TABLET, EXTENDED RELEASE ORAL at 09:09

## 2024-11-09 RX ADMIN — Medication 100 MG: at 09:09

## 2024-11-09 NOTE — DISCHARGE INSTR - COC
Continuity of Care Form    Patient Name: Harmeet Doyle   :  1985  MRN:  [id number]    Admit date:  2024  Discharge date:  2024    Code Status Order: Full Code   Advance Directives:   Advance Care Flowsheet Documentation             Admitting Physician:  Jose Romero MD  PCP: Rivka Carrillo MD    Discharging Nurse: William ORO RN  Discharging Hospital Unit/Room#: 5404/5404-A  Discharging Unit Phone Number: [phone]    Emergency Contact:   Extended Emergency Contact Information  Primary Emergency Contact: Cheo Barnett  Home Phone: [phone]  Relation: Girlfriend   needed? No  Secondary Emergency Contact: johnathan acevedo  Home Phone: [phone]  Relation: Grandparent  Preferred language: English   needed? No    Past Surgical History:  Past Surgical History:   Procedure Laterality Date    SPINAL FUSION      pedicle screw and chantelle fusion for scoliosis as a child       Immunization History:   Immunization History   Administered Date(s) Administered    COVID-19, MODERNA BLUE border, Primary or Immunocompromised, (age 12y+), IM, 100 mcg/0.5mL 2021, 2021    Pneumococcal, PCV20, PREVNAR 20, (age 6w+), IM, 0.5mL 2022       Active Problems:  Patient Active Problem List   Diagnosis Code    Hypertension I10    Abscess L02.91    Heart murmur R01.1       Isolation/Infection:   Isolation            No Isolation          Patient Infection Status       None to display                     Nurse Assessment:  Last Vital Signs: BP (!) 141/110   Pulse 96   Temp 97.4 °F (36.3 °C) (Temporal)   Resp 20   Ht 1.803 m (5' 11\")   Wt 86.2 kg (190 lb)   SpO2 100%   BMI 26.50 kg/m²     Last documented pain score (0-10 scale): Pain Level: 2  Last Weight:   Wt Readings from Last 1 Encounters:   24 86.2 kg (190 lb)     Mental Status:  oriented, alert, coherent, logical, thought processes intact, and able to concentrate and follow conversation    IV Access:  - None    Nursing

## 2024-11-09 NOTE — DISCHARGE SUMMARY
Discharge Summary    Harmeet Doyle  :  1985  MRN:  [id number]    Admit date:  2024  Discharge date:      Admitting Physician:  Jose Romero MD      Admission Condition:  poor    Discharged Condition:  good    Discharge Diagnoses:   Patient Active Problem List   Diagnosis    Hypertension    Abscess    Heart murmur       Hospital Course:     Harmeet Doyle is a 39 y.o. male who presented to the ED for abdominal pain, constipation.  OTC medications did not help. No other associated symptoms. Patient does drink about 4-5 beers (24 ounce) as well as 1 L of liquor daily that he splits with someone else. He is also a smoker, smoking a pack a day since he was 13 years old. Denies any drug use. Work up revealed pancreatitis on CTAP  .  He was admitted for acute pancreatitis. Patient was able to tolerate CLD and advance diet at the end of hospital day 1. He was found to be hyponatremic that was treated with fluid restriction. He was advised to see peer recovery. He was started on amlodipine and discontinued HCTZ for BP. Advised to be abstinent from alcohol and tobacco. The patient's course was otherwise uneventful. He progressed well, pain was controlled on PO medications.  He was tolerating a regular diet with no nausea or vomiting, and was in a suitable condition for discharge to home    Discharge plan:  HTN - start amlodipine 10mg everyday - recheck in PCP office  Acute pancreatitis 2/2 alcohol - abstinent from alcohol, outpatient referral to recovery  Tobacco abuse - outpatient referral  Hyponatremia - continue to monitor outpatient  Follow up in PCP office on 2024      Discharge Medications:         Medication List        START taking these medications      amLODIPine 10 MG tablet  Commonly known as: NORVASC  Take 1 tablet by mouth daily  Start taking on: November 10, 2024     folic acid 1 MG tablet  Commonly known as: FOLVITE  Take 1 tablet by mouth daily  Start taking on: November 10, 2024

## 2024-11-09 NOTE — DISCHARGE INSTRUCTIONS
DISCHARGE INSTRUCTIONS - FAMILY MEDICINE    Follow up at the [facility] ([facility]) on Visit date not found. If there are any issues and cannot present to your appointment, please contact us at [phone] and we will schedule a new appointment for you.     Future Appointments   Date Time Provider Department Center   11/14/2024  3:00 PM Marilyn Sarmiento Chi, MD [facility]:  [address] [place] [address]         Phone: [phone]    Once discharged from [facility], you can :    Return to work : normal  Activity : activity as tolerated  Stairs : As tolerated  Exercise : As tolerated  Lifting : As tolerated   Sexual activity : Yes  Driving : With seat belt on. NO driving on narcotic pain medication if prescribed   Medications : Always take your medications as prescribed  Wound Care: none needed  Diet : You are asked to make an attempt to improve diet and exercise patterns to aid in medical management of your medical condition/problem.    Call [facility] with any further questions. Return to Emergency Department with any worsening of your condition and/or fever greater than 101 degrees, new weakness, shortness of breath or chest pain.  ______________________________________________________________________    =====================================   [facility]   DISCHARGE INSTRUCTIONS   =====================================   Take your medications as directed in this summary     Follow up with all your future appointments as advised   Call [phone] to confirm your appointment with [facility] ([facility]) as soon as possible and/or if there are any appointment changes or other issues.     It is important that you follow up with us for better monitoring of the current cause of your hospitalization. Follow up as well with the specialists that saw you during your stay. If you have any questions call us  [phone].

## 2024-11-09 NOTE — PLAN OF CARE
Problem: Discharge Planning  Goal: Discharge to home or other facility with appropriate resources  11/9/2024 1128 by William Alfonso RN  Outcome: Progressing

## 2024-11-09 NOTE — PROGRESS NOTES
CLINICAL PHARMACY NOTE: MEDS TO BEDS    Total # of Prescriptions Filled: 4   The following medications were delivered to the patient:  Folic acid 1mg tabs  Amlodipine 10mg tabs  Miralax 510g  Vitamin b100    Additional Documentation:  To pt

## 2024-11-09 NOTE — CARE COORDINATION
Social Work Discharge Planning:  Discharge order noted. SW spoke to nursing patient has no needs.  Electronically signed by NELSON Zamora on 11/9/2024 at 10:29 AM

## 2024-11-09 NOTE — PLAN OF CARE
Problem: Discharge Planning  Goal: Discharge to home or other facility with appropriate resources  11/9/2024 1135 by William Alfonso RN  Outcome: Adequate for Discharge     Problem: Pain  Goal: Verbalizes/displays adequate comfort level or baseline comfort level  11/9/2024 1135 by William Alfonso RN  Outcome: Adequate for Discharge     Problem: Safety - Adult  Goal: Free from fall injury  11/9/2024 1135 by William Alfonso RN  Outcome: Adequate for Discharge

## 2024-11-12 ENCOUNTER — TELEPHONE (OUTPATIENT)
Dept: FAMILY MEDICINE CLINIC | Age: 39
End: 2024-11-12

## 2024-11-12 LAB — VIT B1 PYROPHOSHATE BLD-SCNC: 177 NMOL/L (ref 70–180)

## 2025-07-23 ENCOUNTER — HOSPITAL ENCOUNTER (EMERGENCY)
Age: 40
Discharge: HOME | End: 2025-07-23
Payer: COMMERCIAL

## 2025-07-23 ENCOUNTER — APPOINTMENT (OUTPATIENT)
Dept: GENERAL RADIOLOGY | Age: 40
End: 2025-07-23
Payer: COMMERCIAL

## 2025-07-23 VITALS
HEIGHT: 71 IN | TEMPERATURE: 98.8 F | DIASTOLIC BLOOD PRESSURE: 115 MMHG | WEIGHT: 195 LBS | BODY MASS INDEX: 27.3 KG/M2 | SYSTOLIC BLOOD PRESSURE: 155 MMHG | RESPIRATION RATE: 16 BRPM | OXYGEN SATURATION: 100 % | HEART RATE: 90 BPM

## 2025-07-23 DIAGNOSIS — S91.332A: Primary | ICD-10-CM

## 2025-07-23 PROCEDURE — 73630 X-RAY EXAM OF FOOT: CPT

## 2025-07-23 PROCEDURE — 99283 EMERGENCY DEPT VISIT LOW MDM: CPT

## 2025-07-23 PROCEDURE — 6370000000 HC RX 637 (ALT 250 FOR IP): Performed by: PHYSICIAN ASSISTANT

## 2025-07-23 RX ORDER — CIPROFLOXACIN 500 MG/1
500 TABLET, FILM COATED ORAL 2 TIMES DAILY
Qty: 14 TABLET | Refills: 0 | Status: SHIPPED | OUTPATIENT
Start: 2025-07-23 | End: 2025-07-30

## 2025-07-23 RX ORDER — CIPROFLOXACIN 500 MG/1
500 TABLET, FILM COATED ORAL ONCE
Status: COMPLETED | OUTPATIENT
Start: 2025-07-23 | End: 2025-07-23

## 2025-07-23 RX ORDER — IBUPROFEN 800 MG/1
800 TABLET, FILM COATED ORAL EVERY 6 HOURS PRN
Qty: 20 TABLET | Refills: 0 | Status: SHIPPED | OUTPATIENT
Start: 2025-07-23 | End: 2025-07-28

## 2025-07-23 RX ORDER — IBUPROFEN 800 MG/1
800 TABLET, FILM COATED ORAL ONCE
Status: COMPLETED | OUTPATIENT
Start: 2025-07-23 | End: 2025-07-23

## 2025-07-23 RX ADMIN — IBUPROFEN 800 MG: 800 TABLET, FILM COATED ORAL at 01:17

## 2025-07-23 RX ADMIN — CIPROFLOXACIN HYDROCHLORIDE 500 MG: 500 TABLET, FILM COATED ORAL at 01:17

## 2025-07-23 ASSESSMENT — PAIN SCALES - GENERAL
PAINLEVEL_OUTOF10: 10
PAINLEVEL_OUTOF10: 10

## 2025-07-23 ASSESSMENT — PAIN DESCRIPTION - ORIENTATION: ORIENTATION: LEFT

## 2025-07-23 ASSESSMENT — PAIN DESCRIPTION - LOCATION: LOCATION: FOOT

## 2025-07-23 ASSESSMENT — PAIN - FUNCTIONAL ASSESSMENT: PAIN_FUNCTIONAL_ASSESSMENT: 0-10

## 2025-07-23 NOTE — ED PROVIDER NOTES
Independent LEXUS Visit.     [facility]  Department of Emergency Medicine   ED  Encounter Note  Admit Date/RoomTime: 2025 12:43 AM  ED Room: 35/35    NAME: Harmeet Doyle  : 1985  MRN: [id number]     Chief Complaint:  Foot Injury (Left foot pain, stepped on a nail at work)    History of Present Illness       Harmeet Doyle is a 39 y.o. old male presenting to the emergency department by private vehicle, for a puncture wound to the Left foot, caused by nail, which occured a few minute(s) prior to arrival while at work.  There is  a possibility of retained foreign body in the affected area.  Bleeding is  controlled.  Pain at injury site: Yes. He takes no blood thinning agents.  Tetanus Status:  .     ROS   Pertinent positives and negatives are stated within HPI, all other systems reviewed and are negative.    Past Medical History:  has a past medical history of High blood pressure.    Surgical History:  has a past surgical history that includes Spinal fusion.    Social History:  reports that he has been smoking cigarettes. He has never used smokeless tobacco. He reports current alcohol use. He reports that he does not use drugs.    Family History: family history includes Cancer in his maternal grandfather and sister; Diabetes in his father and mother; High Blood Pressure in his mother.     Allergies: Patient has no known allergies.    Physical Exam   Oxygen Saturation Interpretation: Normal.        ED Triage Vitals   BP Systolic BP Percentile Diastolic BP Percentile Temp Temp Source Pulse Respirations SpO2   25 0040 -- -- 25 0040 25 0040 25 0040 25 0040 25 0040   (!) 155/115   98.8 °F (37.1 °C) Oral 90 16 100 %      Height Weight - Scale         25 0011 25 0011         1.803 m (5' 11\") 88.5 kg (195 lb)               Constitutional:  Alert, development consistent with age.  HEENT:  NC/NT.  Airway patent.  Neck:  Normal ROM.

## 2025-07-23 NOTE — DISCHARGE INSTR - COC
Continuity of Care Form    Patient Name: Harmeet Doyle   :  1985  MRN:  [id number]    Admit date:  2025  Discharge date:  ***    Code Status Order: Prior   Advance Directives:     Admitting Physician:  No admitting provider for patient encounter.  PCP: Rivka Carrillo MD    Discharging Nurse: ***  Discharging Hospital Unit/Room#: GABINO/GABINO  Discharging Unit Phone Number: ***    Emergency Contact:   Extended Emergency Contact Information  Primary Emergency Contact: Cheo Barnett  Home Phone: [phone]  Relation: Girlfriend   needed? No  Secondary Emergency Contact: johnathan acevedo  Home Phone: [phone]  Relation: Grandparent  Preferred language: English   needed? No    Past Surgical History:  Past Surgical History:   Procedure Laterality Date    SPINAL FUSION      pedicle screw and chantelle fusion for scoliosis as a child       Immunization History:   Immunization History   Administered Date(s) Administered    COVID-19, MODERNA BLUE border, Primary or Immunocompromised, (age 12y+), IM, 100 mcg/0.5mL 2021, 2021    Pneumococcal, PCV20, PREVNAR 20, (age 6w+), IM, 0.5mL 2022       Active Problems:  Patient Active Problem List   Diagnosis Code    Hypertension I10    Abscess L02.91    Heart murmur R01.1       Isolation/Infection:   Isolation            No Isolation          Patient Infection Status    None to display              Nurse Assessment:  Last Vital Signs: BP (!) 155/115   Pulse 90   Temp 98.8 °F (37.1 °C) (Oral)   Resp 16   Ht 1.803 m (5' 11\")   Wt 88.5 kg (195 lb)   SpO2 100%   BMI 27.20 kg/m²     Last documented pain score (0-10 scale): Pain Level: 10  Last Weight:   Wt Readings from Last 1 Encounters:   25 88.5 kg (195 lb)     Mental Status:  {IP PT MENTAL STATUS:}    IV Access:  { AL IV ACCESS:[id number]}    Nursing Mobility/ADLs:  Walking   {CHP DME ADLs:908909333}  Transfer  {CHP DME ADLs:275834414}  Bathing  {CHP DME